# Patient Record
Sex: MALE | Race: WHITE | NOT HISPANIC OR LATINO | Employment: OTHER | URBAN - METROPOLITAN AREA
[De-identification: names, ages, dates, MRNs, and addresses within clinical notes are randomized per-mention and may not be internally consistent; named-entity substitution may affect disease eponyms.]

---

## 2017-08-10 ENCOUNTER — GENERIC CONVERSION - ENCOUNTER (OUTPATIENT)
Dept: OTHER | Facility: OTHER | Age: 52
End: 2017-08-10

## 2017-09-11 ENCOUNTER — ALLSCRIPTS OFFICE VISIT (OUTPATIENT)
Dept: OTHER | Facility: OTHER | Age: 52
End: 2017-09-11

## 2017-10-03 ENCOUNTER — ANESTHESIA EVENT (OUTPATIENT)
Dept: PERIOP | Facility: AMBULARY SURGERY CENTER | Age: 52
End: 2017-10-03

## 2017-10-12 ENCOUNTER — ANESTHESIA (OUTPATIENT)
Dept: PERIOP | Facility: AMBULARY SURGERY CENTER | Age: 52
End: 2017-10-12

## 2017-10-25 ENCOUNTER — ANESTHESIA EVENT (OUTPATIENT)
Dept: GASTROENTEROLOGY | Facility: HOSPITAL | Age: 52
End: 2017-10-25
Payer: COMMERCIAL

## 2017-10-26 NOTE — CONSULTS
Assessment  1  Colon cancer screening (V76 51) (Z12 11)    Plan  Colon cancer screening    · Suprep Bowel Prep Kit 17 5-3 13-1 6 GM/180ML Oral Solution; DILUTE CONTENTS  AND USE AS DIRECTED FOR BOWEL PREP   Rx By: Connie Shields; Dispense: 0 Days ; #:1 ML; Refill: 0;For: Colon cancer screening; INGE = N; Verified Transmission to [id number] Last Updated By: System, Provigent; 9/11/2017 2:16:02 PM   · COLONOSCOPY (GI, SURG); Status:Hold For - Scheduling; Requested for:[id number];    [facility]; Due:19Nfc1013; Ordered; For:Colon cancer screening; Ordered By:Clementina Putnam;    Discussion/Summary  Discussion Summary: 1  Colon cancer screening: average risk, no prior colonoscopy, not on any antiplatelet agents  We'll schedule colonoscopy today  Patient was explained about the risks and benefits of the procedure  Risks including but not limited to bleeding, infection, perforation were explained in detail  Also explained about less than 100% sensitivity with the exam and other alternatives  Chief Complaint  Chief Complaint Free Text Note Form: Colon cancer screening      History of Present Illness  HPI: This is a 57-year-old Morbidly obese male, who presents for evaluation of colorectal cancer screening  Patient denies any change in bowel habits, denies constipation, hematochezia or unintentional weight loss  He denies any family history of colon cancer but does have family history of colon polyps in his father  He has never had a colonoscopy himself before  He denies any symptoms of heartburn, dysphagia, hematemesis, melena or nausea and vomiting  Review of Systems  Complete-Male GI Adult:   Constitutional: No fever or chills, feels well, no tiredness, no recent weight gain or weight loss  Eyes: No complaints of eye pain, no red eyes, no discharge from eyes, no itchy eyes     ENT: no complaints of earache, no hearing loss, no nosebleeds, no nasal discharge, no sore throat, no hoarseness  Cardiovascular: No complaints of slow heart rate, no fast heart rate, no chest pain, no palpitations, no leg claudication, no lower extremity  Respiratory: No complaints of shortness of breath, no wheezing, no cough, no SOB on exertion, no orthopnea or PND  Gastrointestinal: as noted in HPI  Genitourinary: No complaints of dysuria, no incontinence, no hesitancy, no nocturia, no genital lesion, no testicular pain  Musculoskeletal: No complaints of arthralgia, no myalgias, no joint swelling or stiffness, no limb pain or swelling  Integumentary: No complaints of skin rash or skin lesions, no itching, no skin wound, no dry skin  Neurological: No compliants of headache, no confusion, no convulsions, no numbness or tingling, no dizziness or fainting, no limb weakness, no difficulty walking  Psychiatric: Is not suicidal, no sleep disturbances, no anxiety or depression, no change in personality, no emotional problems  Endocrine: No complaints of proptosis, no hot flashes, no muscle weakness, no erectile dysfunction, no deepening of the voice, no feelings of weakness  Hematologic/Lymphatic: No complaints of swollen glands, no swollen glands in the neck, does not bleed easily, no easy bruising  ROS Reviewed:   ROS reviewed  Active Problems  1  Allergic rhinitis (477 9) (J30 9)   2  Anxiety disorder (300 00) (F41 9)   3  Arthritis of knee (716 96) (M17 10)   4  Asthma (493 90) (J45 909)   5  Constipation (564 00) (K59 00)   6  Depression (311) (F32 9)   7  Eczema (692 9) (L30 9)   8  Erectile dysfunction of non-organic origin (302 72) (F52 21)   9  Fatigue (780 79) (R53 83)   10  Hyperlipidemia (272 4) (E78 5)   11  Insomnia (780 52) (G47 00)   12  Laryngitis (464 00) (J04 0)   13  Low back pain (724 2) (M54 5)   14  Morbid obesity (278 01) (E66 01)   15  Morbid or severe obesity due to excess calories (278 01) (E66 01)   16   Need for prophylactic vaccination and inoculation against influenza (V04 81) (Z23)   17  Other muscle spasm (728 85) (M62 838)   18  Pharyngitis (462) (J02 9)   19  Sleep apnea (780 57) (G47 30)   20  Urge incontinence of urine (788 31) (N39 41)    Past Medical History  1  Need for prophylactic vaccination and inoculation against influenza (V04 81) (Z23)  Active Problems And Past Medical History Reviewed: The active problems and past medical history were reviewed and updated today  Surgical History  1  History of Gallbladder Surgery   2  History of Hernia Repair  Surgical History Reviewed: The surgical history was reviewed and updated today  Family History  Mother    1  Family history of Breast Cancer (V16 3)  Father    2  Family history of Heart Disease (V17 49)  Family History Reviewed: The family history was reviewed and updated today  Social History   · Alcohol Use (History)   · Daily Coffee Consumption (___ Cups/Day)   · Denied: History of Daily Cola Consumption (___ Cans/Day)   · Daily Tea Consumption (___ Cups/Day)   · Never A Smoker  Social History Reviewed: The social history was reviewed and updated today  Current Meds   1  Advair Diskus 250-50 MCG/DOSE Inhalation Aerosol Powder Breath Activated; INHALE 1   PUFF TWICE DAILY  RINSE MOUTH AFTER USE; Therapy: 66JDM3140 to (Aleisha Meek)  Requested for: 94CZX1189; Last   GI:25INB9455 Ordered   2  BuPROPion HCl ER (SR) 150 MG Oral Tablet Extended Release 12 Hour; TAKE 1   TABLET TWICE DAILY; Therapy: 32ICE0298 to (Evaluate:42Qoh5615)  Requested for: 20Jan2017; Last   Rx:20Jan2017 Ordered   3  Cialis 10 MG Oral Tablet; TAKE 1 TABLET 1 HOUR BEFORE ACTIVITY AS NEEDED; Therapy: 38MDR4049 to (Evaluate:30Jun2016); Last Rx:02Mar2016 Ordered   4  Fluticasone Propionate 50 MCG/ACT Nasal Suspension; USE 2 SPRAYS IN EACH   NOSTRIL DAILY; Therapy: 71FBF4376 to ([phone])  Requested for: 58Doo9671; Last   Rx:45Crc5725 Ordered   5   Meloxicam 15 MG Oral Tablet; TAKE 1 TABLET BY MOUTH EVERY DAY WITH FOOD; Therapy: 11Aug2015 to (Evaluate:13Kuh7299)  Requested for: 59JAJ8002; Last   Rx:93Ogj0373 Ordered   6  Montelukast Sodium 10 MG Oral Tablet; TAKE ONE TABLET BY MOUTH ONCE DAILY; Therapy: 47DIL0998 to (Evaluate:15Jan2018)  Requested for: 20Jan2017; Last   Rx:20Jan2017 Ordered   7  Proventil  (90 Base) MCG/ACT Inhalation Aerosol Solution; INHALE ONE TO   TWO PUFFS BY MOUTH EVERY 4 TO 6 HOURS AS NEEDED AND AS DIRECTED; Therapy: 43WRT9429 to (Heladio Qiu)  Requested for: 62DOT3162; Last   Rx:11Lav5710 Ordered   8  TraMADol HCl  MG Oral Tablet Extended Release 24 Hour; TAKE 1 TABLET DAILY   AS NEEDED; Therapy: 83GLO3244 to (Evaluate:29Bvx7810); Last Rx:01Xnp3773 Ordered   9  Zolpidem Tartrate 10 MG Oral Tablet; Take 1 tablet daily; Therapy: 72XWP1679 to (Evaluate:08Qtd9155)  Requested for: 25VQS9158; Last   Rx:77Mgb1121 Ordered  Medication List Reviewed: The medication list was reviewed and updated today  Allergies  1  Opioid Analgesics    Vitals  Vital Signs    Recorded: 11Sep2017 02:08PM   Temperature 97 5 F   Heart Rate 88   Systolic 991   Diastolic 62   Weight 345 lb    BMI Calculated 46 96   BSA Calculated 2 6   O2 Saturation 95     Physical Exam    Constitutional   General appearance: No acute distress, well appearing and well nourished  Eyes   Conjunctiva and lids: No swelling, erythema, or discharge  Pulmonary   Respiratory effort: No increased work of breathing or signs of respiratory distress  Auscultation of lungs: Clear to auscultation, equal breath sounds bilaterally, no wheezes, no rales, no rhonci  Cardiovascular   Palpation of heart: Normal PMI, no thrills  Auscultation of heart: Normal rate and rhythm, normal S1 and S2, without murmurs  Examination of extremities for edema and/or varicosities: Normal     Abdomen   Abdomen: Non-tender, no masses  Liver and spleen: No hepatomegaly or splenomegaly      Lymphatic   Palpation of lymph nodes in neck: No lymphadenopathy  Skin   Skin and subcutaneous tissue: Normal without rashes or lesions      Psychiatric   Orientation to person, place and time: Normal     Mood and affect: Normal          Signatures   Electronically signed by : MARITZA Tijerina ; Sep 11 2017  2:20PM EST                       (Author)

## 2017-10-27 RX ORDER — ALBUTEROL SULFATE 90 UG/1
2 AEROSOL, METERED RESPIRATORY (INHALATION) EVERY 6 HOURS PRN
COMMUNITY

## 2017-10-27 RX ORDER — FUROSEMIDE 20 MG/1
20 TABLET ORAL
COMMUNITY

## 2017-10-27 RX ORDER — MONTELUKAST SODIUM 10 MG/1
10 TABLET ORAL
COMMUNITY
End: 2018-03-03 | Stop reason: SDUPTHER

## 2017-10-27 RX ORDER — FLUTICASONE PROPIONATE 50 MCG
1 SPRAY, SUSPENSION (ML) NASAL AS NEEDED
COMMUNITY

## 2017-10-27 RX ORDER — ARMODAFINIL 150 MG/1
150 TABLET ORAL
COMMUNITY
End: 2021-01-17 | Stop reason: SDUPTHER

## 2017-10-27 RX ORDER — ZOLPIDEM TARTRATE 10 MG/1
10 TABLET ORAL
COMMUNITY

## 2017-10-27 NOTE — PRE-PROCEDURE INSTRUCTIONS
Pre-Surgery Instructions:   Medication Instructions    albuterol (PROVENTIL HFA,VENTOLIN HFA) 90 mcg/act inhaler Instructed patient per Anesthesia Guidelines   Armodafinil 150 MG tablet Instructed patient per Anesthesia Guidelines   fluticasone (FLONASE) 50 mcg/act nasal spray Instructed patient per Anesthesia Guidelines   fluticasone-salmeterol (ADVAIR) 250-50 mcg/dose inhaler Instructed patient per Anesthesia Guidelines   furosemide (LASIX) 20 mg tablet Instructed patient per Anesthesia Guidelines   montelukast (SINGULAIR) 10 mg tablet Instructed patient per Anesthesia Guidelines   Testosterone Cypionate & Prop (TESTOSTERONE EO-PRO- IM) Instructed patient per Anesthesia Guidelines   zolpidem (AMBIEN) 10 mg tablet Instructed patient per Anesthesia Guidelines      Pre op instructions given-instructed to follow Dr Silver Lockwood instructions including bowel prep

## 2017-11-13 ENCOUNTER — ANESTHESIA (OUTPATIENT)
Dept: GASTROENTEROLOGY | Facility: HOSPITAL | Age: 52
End: 2017-11-13
Payer: COMMERCIAL

## 2018-01-04 ENCOUNTER — GENERIC CONVERSION - ENCOUNTER (OUTPATIENT)
Dept: GASTROENTEROLOGY | Facility: CLINIC | Age: 53
End: 2018-01-04

## 2018-01-04 ENCOUNTER — HOSPITAL ENCOUNTER (OUTPATIENT)
Facility: HOSPITAL | Age: 53
Setting detail: OUTPATIENT SURGERY
Discharge: HOME | End: 2018-01-04
Attending: INTERNAL MEDICINE | Admitting: INTERNAL MEDICINE
Payer: COMMERCIAL

## 2018-01-04 VITALS
HEART RATE: 67 BPM | RESPIRATION RATE: 18 BRPM | OXYGEN SATURATION: 96 % | HEIGHT: 71 IN | WEIGHT: 304.24 LBS | SYSTOLIC BLOOD PRESSURE: 113 MMHG | TEMPERATURE: 97.6 F | DIASTOLIC BLOOD PRESSURE: 70 MMHG | BODY MASS INDEX: 42.59 KG/M2

## 2018-01-04 DIAGNOSIS — Z12.11 ENCOUNTER FOR SCREENING FOR MALIGNANT NEOPLASM OF COLON: ICD-10-CM

## 2018-01-04 PROCEDURE — 88305 TISSUE EXAM BY PATHOLOGIST: CPT | Performed by: INTERNAL MEDICINE

## 2018-01-04 RX ORDER — SODIUM CHLORIDE 9 MG/ML
125 INJECTION, SOLUTION INTRAVENOUS CONTINUOUS
Status: DISCONTINUED | OUTPATIENT
Start: 2018-01-04 | End: 2018-01-04 | Stop reason: HOSPADM

## 2018-01-04 RX ORDER — LIDOCAINE HYDROCHLORIDE 10 MG/ML
INJECTION, SOLUTION INFILTRATION; PERINEURAL AS NEEDED
Status: DISCONTINUED | OUTPATIENT
Start: 2018-01-04 | End: 2018-01-04 | Stop reason: SURG

## 2018-01-04 RX ORDER — DIPHENOXYLATE HYDROCHLORIDE AND ATROPINE SULFATE 2.5; .025 MG/1; MG/1
1 TABLET ORAL DAILY
COMMUNITY

## 2018-01-04 RX ORDER — PROPOFOL 10 MG/ML
INJECTION, EMULSION INTRAVENOUS AS NEEDED
Status: DISCONTINUED | OUTPATIENT
Start: 2018-01-04 | End: 2018-01-04 | Stop reason: SURG

## 2018-01-04 RX ADMIN — PROPOFOL 50 MG: 10 INJECTION, EMULSION INTRAVENOUS at 09:18

## 2018-01-04 RX ADMIN — SODIUM CHLORIDE: 0.9 INJECTION, SOLUTION INTRAVENOUS at 08:49

## 2018-01-04 RX ADMIN — PROPOFOL 50 MG: 10 INJECTION, EMULSION INTRAVENOUS at 09:12

## 2018-01-04 RX ADMIN — PROPOFOL 100 MG: 10 INJECTION, EMULSION INTRAVENOUS at 09:03

## 2018-01-04 RX ADMIN — PROPOFOL 50 MG: 10 INJECTION, EMULSION INTRAVENOUS at 09:08

## 2018-01-04 RX ADMIN — PROPOFOL 50 MG: 10 INJECTION, EMULSION INTRAVENOUS at 09:15

## 2018-01-04 RX ADMIN — LIDOCAINE HYDROCHLORIDE 20 MG: 10 INJECTION, SOLUTION INFILTRATION; PERINEURAL at 09:03

## 2018-01-04 RX ADMIN — PROPOFOL 25 MG: 10 INJECTION, EMULSION INTRAVENOUS at 09:04

## 2018-01-04 RX ADMIN — PROPOFOL 25 MG: 10 INJECTION, EMULSION INTRAVENOUS at 09:06

## 2018-01-04 RX ADMIN — PROPOFOL 50 MG: 10 INJECTION, EMULSION INTRAVENOUS at 09:10

## 2018-01-04 NOTE — DISCHARGE INSTRUCTIONS
Discharge home  Resume regular diet  Resume home meds  Call the office in 3 weeks for biopsy results  Repeat colonoscopy 5-10 years based on path  Call the office if you have severe abdominal pain, bleeding, or fevers  Colonoscopy   AMBULATORY CARE:   What you need to know about a colonoscopy:  A colonoscopy is a procedure to examine the inside of your colon (intestine) with a scope  A scope is a flexible tube with a small light and camera on the end  Polyps or tissue growths may be removed during your colonoscopy  What you need to do the week before your colonoscopy: You will need to stop taking medicines that contain aspirin or iron for 7 days before your colonoscopy  If you take anticoagulants, such as warfarin, ask when you should stop taking it  Make plans for someone to drive you home after your procedure  How to prepare for your colonoscopy: Your healthcare provider will have you prepare your bowels before your procedure  Your bowels will need to be empty before your procedure to allow him to clearly see your colon  You will need to do the following the day before your procedure:  · Have only clear liquids  for the entire day before your colonoscopy  Clear liquid diet includes clear fruit juices and broths, clear flavored gelatin, and hard candy  It also includes coffee, tea, carbonated beverages, and clear sports drinks  · Follow your bowel prep as directed  There are many different preparations that can be given before a colonoscopy  Some are given over 2 hours and others over 6 hours  Some are given earlier in the afternoon the day before the colonoscopy  Others are given the day before and then the morning of the colonoscopy  With any bowel prep, stay close to the bathroom  This liquid will cause your bowels to move frequently  · An enema  may be needed  Your healthcare provider may tell you to use an enema to help clean out your bowels      · Do not eat or drink anything after midnight  This will help prevent problems that can happen if you vomit while under anesthesia  What will happen during your colonoscopy:   · You will be given medicine to help you relax  You will lie on your left side and raise one or both knees toward your chest  Your healthcare provider will examine your anus and use a finger to check your rectum  You may need another enema if your bowel is not empty  The scope will be lubricated and gently placed into your anus  It will then be passed through your rectum and into your colon  Water or air will be put into your colon to help clean or expand it  This is done so your healthcare provider can see your colon clearly  · Tissue samples may be taken from the walls of your bowel and sent to a lab for tests  If you have a polyp, your healthcare provider will pass a wire loop through the scope and use it to hold the polyp  The polyp is then burned or cut off the wall of your colon  Removed polyps are sent to a lab for tests  Pictures of your colon may be taken during the procedure  The scope will be removed when the procedure is done  What will happen after your colonoscopy:   · Rest after your procedure  You may feel bloated, have some gas and abdominal discomfort  You may need to lie on your right side with a heating pad on your abdomen  You may need to take short walks to help move the gas out  Eat small meals, if you feel bloated  Do not drive or make important decisions until the day after your procedure  · You may have polyps removed  Do not take aspirin or go on long car trips for 7 days after your procedure  Ask your healthcare provider about any other limits after your procedure  Risks of a colonoscopy: You may have pain or bleeding after the scope or polyps are removed  You may also have a slow heartbeat, decreased blood pressure, or increased sweating  Your colon may tear due to the increased pressure from the scope and other instruments   This may cause bowel contents to leak out of your colon and into your abdomen  If this happens, you will need to stay in the hospital and have surgery on your colon  Seek care immediately if:   · You have a large amount of bright red blood in your bowel movements  · Your abdomen is hard and firm and you have severe pain  · You have sudden trouble breathing  Contact your healthcare provider if:   · You develop a rash or hives  · You have a fever within 24 hours of your procedure  · You have not had a bowel movement for 3 days after your procedure  · You have questions or concerns about your condition or care  Activity:   · Do not lift, strain, or run  for 3 days after your procedure  · Rest after your procedure  You have been given medicine to relax you  Do not  drive or make important decisions until the day after your procedure  Return to your normal activity as directed  · Relieve gas and discomfort from bloating  by lying on your right side with a heating pad on your abdomen  You may need to take short walks to help the gas move out  Eat small meals until bloating is relieved  If you had polyps removed: For 7 days after your procedure:  · Do not  take aspirin  · Do not  go on long car rides  Help prevent constipation:   · Eat a variety of healthy foods  Healthy foods include fruit, vegetables, whole-grain breads, low-fat dairy products, beans, lean meat, and fish  Ask if you need to be on a special diet  Your healthcare provider may recommend that you eat high-fiber foods such as cooked beans  Fiber helps you have regular bowel movements  · Drink liquids as directed  Adults should drink between 9 and 13 eight-ounce cups of liquid every day  Ask what amount is best for you  For most people, good liquids to drink are water, juice, and milk  · Exercise as directed  Talk to your healthcare provider about the best exercise plan for you   Exercise can help prevent constipation, decrease your blood pressure and improve your health  Follow up with your healthcare provider as directed:  Write down your questions so you remember to ask them during your visits  © 2017 [address] [employer]  Information is for End User's use only and may not be sold, redistributed or otherwise used for commercial purposes  All illustrations and images included in CareNotes® are the copyrighted property of [employer] M [employer]  or [address]  The above information is an  only  It is not intended as medical advice for individual conditions or treatments  Talk to your doctor, nurse or pharmacist before following any medical regimen to see if it is safe and effective for you  Diverticulosis   WHAT YOU NEED TO KNOW:   Diverticulosis is a condition that causes small pockets called diverticula to form in your intestine  These pockets make it difficult for bowel movements to pass through your digestive system  DISCHARGE INSTRUCTIONS:   Seek care immediately if:   · You have severe pain on the left side of your lower abdomen  · Your bowel movements are bright or dark red  Contact your healthcare provider if:   · You have a fever and chills  · You feel dizzy or lightheaded  · You have nausea, or you are vomiting  · You have a change in your bowel movements  · You have questions or concerns about your condition or care  Medicines:   · Medicines  to soften your bowel movements may be given  You may also need medicines to treat symptoms such as bloating and pain  · Take your medicine as directed  Contact your healthcare provider if you think your medicine is not helping or if you have side effects  Tell him or her if you are allergic to any medicine  Keep a list of the medicines, vitamins, and herbs you take  Include the amounts, and when and why you take them  Bring the list or the pill bottles to follow-up visits   Carry your medicine list with you in case of an emergency  Self-care: The goal of treatment is to manage any symptoms you have and prevent other problems such as diverticulitis  Diverticulitis is swelling or infection of the diverticula  Your healthcare provider may recommend any of the following:  · Eat a variety of high-fiber foods  High-fiber foods help you have regular bowel movements  High-fiber foods include cooked beans, fruits, vegetables, and some cereals  Most adults need 25 to 35 grams of fiber each day  Your healthcare provider may recommend that you have more  Ask your healthcare provider how much fiber you need  Increase fiber slowly  You may have abdominal discomfort, bloating, and gas if you add fiber to your diet too quickly  You may need to take a fiber supplement if you are not getting enough fiber from food  · Drink liquids as directed  You may need to drink 2 to 3 liters (8 to 12 cups) of liquids every day  Ask your healthcare provider how much liquid to drink each day and which liquids are best for you  · Apply heat  on your abdomen for 20 to 30 minutes every 2 hours for as many days as directed  Heat helps decrease pain and muscle spasms  Help prevent diverticulitis or other symptoms: The following may help decrease your risk for diverticulitis or symptoms, such as bleeding  Talk to your provider about these or other things you can do to prevent problems that may occur with diverticulosis  · Exercise regularly  Ask your healthcare provider about the best exercise plan for you  Exercise can help you have regular bowel movements  Get 30 minutes of exercise on most days of the week  · Maintain a healthy weight  Ask your healthcare provider how much you should weigh  Ask him or her to help you create a weight loss plan if you are overweight  · Do not smoke  Nicotine and other chemicals in cigarettes increase your risk for diverticulitis   Ask your healthcare provider for information if you currently smoke and need help to quit  E-cigarettes or smokeless tobacco still contain nicotine  Talk to your healthcare provider before you use these products  · Ask your healthcare provider if it is safe to take NSAIDs  NSAIDs may increase your risk of diverticulitis  Follow up with your healthcare provider as directed:  Write down your questions so you remember to ask them during your visits  © 2017 [address] [employer] Information is for End User's use only and may not be sold, redistributed or otherwise used for commercial purposes  All illustrations and images included in CareNotes® are the copyrighted property of [employer]  or [address]  The above information is an  only  It is not intended as medical advice for individual conditions or treatments  Talk to your doctor, nurse or pharmacist before following any medical regimen to see if it is safe and effective for you  Diverticulosis   WHAT YOU NEED TO KNOW:   Diverticulosis is a condition that causes small pockets called diverticula to form in your intestine  These pockets make it difficult for bowel movements to pass through your digestive system  DISCHARGE INSTRUCTIONS:   Seek care immediately if:   · You have severe pain on the left side of your lower abdomen  · Your bowel movements are bright or dark red  Contact your healthcare provider if:   · You have a fever and chills  · You feel dizzy or lightheaded  · You have nausea, or you are vomiting  · You have a change in your bowel movements  · You have questions or concerns about your condition or care  Medicines:   · Medicines  to soften your bowel movements may be given  You may also need medicines to treat symptoms such as bloating and pain  · Take your medicine as directed  Contact your healthcare provider if you think your medicine is not helping or if you have side effects  Tell him or her if you are allergic to any medicine   Keep a list of the medicines, vitamins, and herbs you take  Include the amounts, and when and why you take them  Bring the list or the pill bottles to follow-up visits  Carry your medicine list with you in case of an emergency  Self-care: The goal of treatment is to manage any symptoms you have and prevent other problems such as diverticulitis  Diverticulitis is swelling or infection of the diverticula  Your healthcare provider may recommend any of the following:  · Eat a variety of high-fiber foods  High-fiber foods help you have regular bowel movements  High-fiber foods include cooked beans, fruits, vegetables, and some cereals  Most adults need 25 to 35 grams of fiber each day  Your healthcare provider may recommend that you have more  Ask your healthcare provider how much fiber you need  Increase fiber slowly  You may have abdominal discomfort, bloating, and gas if you add fiber to your diet too quickly  You may need to take a fiber supplement if you are not getting enough fiber from food  · Drink liquids as directed  You may need to drink 2 to 3 liters (8 to 12 cups) of liquids every day  Ask your healthcare provider how much liquid to drink each day and which liquids are best for you  · Apply heat  on your abdomen for 20 to 30 minutes every 2 hours for as many days as directed  Heat helps decrease pain and muscle spasms  Help prevent diverticulitis or other symptoms: The following may help decrease your risk for diverticulitis or symptoms, such as bleeding  Talk to your provider about these or other things you can do to prevent problems that may occur with diverticulosis  · Exercise regularly  Ask your healthcare provider about the best exercise plan for you  Exercise can help you have regular bowel movements  Get 30 minutes of exercise on most days of the week  · Maintain a healthy weight  Ask your healthcare provider how much you should weigh   Ask him or her to help you create a weight loss plan if you are overweight  · Do not smoke  Nicotine and other chemicals in cigarettes increase your risk for diverticulitis  Ask your healthcare provider for information if you currently smoke and need help to quit  E-cigarettes or smokeless tobacco still contain nicotine  Talk to your healthcare provider before you use these products  · Ask your healthcare provider if it is safe to take NSAIDs  NSAIDs may increase your risk of diverticulitis  Follow up with your healthcare provider as directed:  Write down your questions so you remember to ask them during your visits  © 2017 [address]  Information is for End User's use only and may not be sold, redistributed or otherwise used for commercial purposes  All illustrations and images included in CareNotes® are the copyrighted property of [employer]  or [employer]  The above information is an  only  It is not intended as medical advice for individual conditions or treatments  Talk to your doctor, nurse or pharmacist before following any medical regimen to see if it is safe and effective for you  Colorectal Polyps   WHAT YOU NEED TO KNOW:   Colorectal polyps are small growths of tissue in the lining of the colon and rectum  Most polyps are hyperplastic polyps and are usually benign (noncancerous)  Certain types of polyps, called adenomatous polyps, may turn into cancer  DISCHARGE INSTRUCTIONS:   Follow up with your healthcare provider or gastroenterologist as directed: You may need to return for more tests, such as another colonoscopy  Write down your questions so you remember to ask them during your visits  Reduce your risk for colorectal polyps:   · Eat a variety of healthy foods:  Healthy foods include fruit, vegetables, whole-grain breads, low-fat dairy products, beans, lean meat, and fish  Ask if you need to be on a special diet      · Maintain a healthy weight:  Ask your healthcare provider if you need to lose weight and how much you need to lose  Ask for help with a weight loss program     · Exercise:  Begin to exercise slowly and do more as you get stronger  Talk with your healthcare provider before you start an exercise program      · Limit alcohol:  Your risk for polyps increases the more you drink  · Do not smoke: If you smoke, it is never too late to quit  Ask for information about how to stop  For support and more information:   · Lindseybiancasammy Jurado ([facility])  [address] [place], [place] [address]  Phone: 7- [phone]  Web Address: www digestive  niddk nih gov  Contact your healthcare provider or gastroenterologist if:   · You have a fever  · You have chills, a cough, or feel weak and achy  · You have abdominal pain that does not go away or gets worse after you take medicine  · Your abdomen is swollen  · You are losing weight without trying  · You have questions or concerns about your condition or care  Seek care immediately or call 911 if:   · You have sudden shortness of breath  · You have a fast heart rate, fast breathing, or are too dizzy to stand up  · You have severe abdominal pain  · You see blood in your bowel movement  © 2017 [address] Information is for End User's use only and may not be sold, redistributed or otherwise used for commercial purposes  All illustrations and images included in CareNotes® are the copyrighted property of [employer]  or [employer]  The above information is an  only  It is not intended as medical advice for individual conditions or treatments  Talk to your doctor, nurse or pharmacist before following any medical regimen to see if it is safe and effective for you

## 2018-01-04 NOTE — ANESTHESIA PREPROCEDURE EVALUATION
Review of Systems/Medical History  Patient summary reviewed  Chart reviewed  No history of anesthetic complications     Cardiovascular  Negative cardio ROS    Pulmonary  Asthma: , Sleep apnea CPAP, ,        GI/Hepatic            Endo/Other     GYN       Hematology   Musculoskeletal  Obesity  morbid obesity,        Neurology   Psychology           Physical Exam    Airway    Mallampati score: II  TM Distance: >3 FB  Neck ROM: full     Dental   upper dentures,     Cardiovascular  Comment: Negative ROS,     Pulmonary      Other Findings        Anesthesia Plan  ASA Score- 2     Anesthesia Type- IV sedation with anesthesia with ASA Monitors  Additional Monitors:   Airway Plan:         Plan Factors-    Induction- intravenous  Postoperative Plan-     Informed Consent- Anesthetic plan and risks discussed with patient  I personally reviewed this patient with the CRNA  Discussed and agreed on the Anesthesia Plan with the CRNA  Arabella Liao

## 2018-01-04 NOTE — OP NOTE
**** GI/ENDOSCOPY REPORT ****     PATIENT NAME: Kevan MAHMOOD ------ VISIT ID:  Patient ID: [id number]   YOB: 1965     INTRODUCTION: Colonoscopy - A 45 male patient presents for an outpatient   Colonoscopy at [place] [address]  PREVIOUS COLONOSCOPY:     INDICATIONS: Average-risk screening  CONSENT:  The benefits, risks, and alternatives to the procedure were   discussed and informed consent was obtained from the patient  PREPARATION: EKG, pulse, pulse oximetry and blood pressure were monitored   throughout the procedure  The patient was identified by myself both   verbally and by visual inspection of ID band  ASA Classification: Class 2   - Patient has mild to moderate systemic disturbance that may or may not be   related to the disorder requiring surgery  Airway Assessment   Classification: Airway class 2 - Visualization of the soft palate, fauces   and uvula  MEDICATIONS: Anesthesia-check records     PROCEDURE:  The endoscope was passed without difficulty through the anus   under direct visualization and advanced to the cecum, confirmed by   appendiceal orifice and ileocecal valve  The scope was withdrawn and the   mucosa was carefully examined  The quality of the preparation was good  Cecal Intubation Time: 2 minutes(s) Scope Withdrawal Time: 13 minutes(s)     RECTAL EXAM: Normal rectal exam      FINDINGS:  A single polyp, measuring less than 5 mm in size, was found in   the rectum  The polyp was removed by cold biopsy polypectomy  There was   evidence of diverticulosis in the sigmoid colon  On retroflexed view,   internal hemorrhoids were found  Otherwise, the colon appeared to be   normal      COMPLICATIONS: There were no complications  IMPRESSIONS: A single polyp found in the rectum; removed by cold biopsy   polypectomy  Diverticulosis found in the sigmoid colon  Internal   hemorrhoids  Otherwise normal colonoscopy with a good prep and good   visualization  RECOMMENDATIONS: Colonoscopy recommended in a 5-10 year  Discharge home   when standard parameters are met  Resume regular diet as tolerated  Continue current medications  Follow-up on the results of the biopsy   specimens  PATHOLOGY SPECIMENS: Removed by cold biopsy polypectomy  Yes     PROCEDURE CODES:     ICD-9 Codes: 211 4 Benign neoplasm of rectum and anal canal 562 10   Diverticulosis of colon (without mention of hemorrhage)     ICD-10 Codes: Z12 11 Encounter for screening for malignant neoplasm of   colon K63 5 Polyp of colon K57 Diverticular disease of intestine K64 9   Unspecified hemorrhoids     PERFORMED BY: MARITZA Joy  on 01/04/2018  Version 1, electronically signed by MARITZA Vaca  on 01/04/2018 at   09:24

## 2018-01-04 NOTE — H&P
History and Physical - [facility]  Amanda Nelson Stem 45 y o  male MRN: [id number]    HPI: Wilton Haider is a 45y o  year old male who presents for average risk screening colonoscopy  Review of Systems    Historical Information   Past Medical History:   Diagnosis Date    Asthma     CPAP (continuous positive airway pressure) dependence     History of transfusion     age 3    Obese     Sleep apnea      Past Surgical History:   Procedure Laterality Date    CHOLECYSTECTOMY      HERNIA REPAIR Bilateral      Social History   History   Alcohol Use    Yes     Comment: socially     History   Drug Use No     History   Smoking Status    Never Smoker   Smokeless Tobacco    Never Used     History reviewed  No pertinent family history  Meds/Allergies     Prescriptions Prior to Admission   Medication    albuterol (PROVENTIL HFA,VENTOLIN HFA) 90 mcg/act inhaler    Armodafinil 150 MG tablet    Cholecalciferol (VITAMIN D PO)    fluticasone (FLONASE) 50 mcg/act nasal spray    fluticasone-salmeterol (ADVAIR) 250-50 mcg/dose inhaler    furosemide (LASIX) 20 mg tablet    montelukast (SINGULAIR) 10 mg tablet    multivitamin (THERAGRAN) TABS    Testosterone Cypionate & Prop (TESTOSTERONE EO-PRO- IM)    zolpidem (AMBIEN) 10 mg tablet       Allergies   Allergen Reactions    Other      Opioid analgesics       Objective     Height 5' 10" (1 778 m), weight (!) 143 kg (315 lb)  PHYSICAL EXAM    Gen: NAD  CV: RRR  CHEST: Clear  ABD: soft, NT/ND  EXT: no edema  Neuro: AAO      ASSESSMENT/PLAN:  This is a 45y o  year old male here for  average risk screening colonoscopy         PLAN:   Procedure: colonoscopy

## 2018-01-07 ENCOUNTER — GENERIC CONVERSION - ENCOUNTER (OUTPATIENT)
Dept: OTHER | Facility: OTHER | Age: 53
End: 2018-01-07

## 2018-01-12 VITALS
SYSTOLIC BLOOD PRESSURE: 140 MMHG | WEIGHT: 315 LBS | TEMPERATURE: 97.5 F | HEART RATE: 88 BPM | DIASTOLIC BLOOD PRESSURE: 62 MMHG | OXYGEN SATURATION: 95 %

## 2018-01-23 NOTE — RESULT NOTES
Discussion/Summary   Please inform the patient that 1 polyp removed was a tubular adenoma  There was no high-grade dysplasia and no cancer  I recommend repeat colonoscopy in 10 years, please put in for recall  Please have the patient call with any questions or concerns  Verified Results  (1) TISSUE EXAM [id number] 09:21AM Placido [place]     Test Name Result Flag Reference   LAB AP CASE REPORT (Report)     Surgical Pathology Report             Case: [id number]                   Authorizing Provider: Simi Tinoco MD      Collected:      01/04/2018 0677        Ordering Location:   [facility]    Received:      01/04/2018 [address] [place] [address] Endoscopy                               Pathologist:      Renetta Ashton MD                             Specimen:  Polyp, Colorectal, rectum   LAB AP FINAL DIAGNOSIS      A  Rectal polyp:    - Hyperplastic polyp     - Negative for dysplasia and malignancy  Electronically signed by Renetta Ashton MD on 1/5/2018 at 12:14 PM   LAB AP NOTE      Interpretation performed at [facility], [place]   LAB AP SURGICAL ADDITIONAL INFORMATION (Report)     All controls performed with the immunohistochemical stains reported above   reacted appropriately  These tests were developed and their performance   characteristics determined by [facility] or   [place]  They may not be cleared or approved by the [place]  Food and Drug Administration  The FDA has determined that such clearance   or approval is not necessary  These tests are used for clinical purposes  They should not be regarded as investigational or for research  This   laboratory has been approved by CLIA 88, designated as a high-complexity   laboratory and is qualified to perform these tests  LAB AP GROSS DESCRIPTION (Report)     A   The specimen is received in formalin, labeled with the patient's name   and hospital number, and is designated rectal polyp  The specimen   consists of 2 tan-pink soft tissue fragments each measuring 0 2 cm in   greatest dimension  The specimen is entirely submitted in 1 cassette  Note: The estimated total formalin fixation time based upon information   provided by the submitting clinician and the standard processing schedule   is under 72 hours       AG[facility]zyk   LAB AP CLINICAL INFORMATION Cold forceps     Cold forceps

## 2018-03-03 DIAGNOSIS — J45.909 UNCOMPLICATED ASTHMA, UNSPECIFIED ASTHMA SEVERITY, UNSPECIFIED WHETHER PERSISTENT: Primary | ICD-10-CM

## 2018-03-04 RX ORDER — MONTELUKAST SODIUM 10 MG/1
TABLET ORAL
Qty: 90 TABLET | Refills: 3 | Status: SHIPPED | OUTPATIENT
Start: 2018-03-04

## 2018-10-02 ENCOUNTER — APPOINTMENT (OUTPATIENT)
Dept: RADIOLOGY | Facility: MEDICAL CENTER | Age: 53
End: 2018-10-02
Payer: COMMERCIAL

## 2018-10-02 ENCOUNTER — TRANSCRIBE ORDERS (OUTPATIENT)
Dept: ADMINISTRATIVE | Facility: HOSPITAL | Age: 53
End: 2018-10-02

## 2018-10-02 DIAGNOSIS — S23.3XXD SPRAIN OF LIGAMENTS OF THORACIC SPINE, SUBSEQUENT ENCOUNTER: Primary | ICD-10-CM

## 2018-10-02 DIAGNOSIS — S23.3XXD SPRAIN OF LIGAMENTS OF THORACIC SPINE, SUBSEQUENT ENCOUNTER: ICD-10-CM

## 2018-10-02 DIAGNOSIS — S23.41XD RIB SPRAIN, SUBSEQUENT ENCOUNTER: ICD-10-CM

## 2018-10-02 PROCEDURE — 71101 X-RAY EXAM UNILAT RIBS/CHEST: CPT

## 2018-10-02 PROCEDURE — 72072 X-RAY EXAM THORAC SPINE 3VWS: CPT

## 2020-12-28 ENCOUNTER — NURSE TRIAGE (OUTPATIENT)
Dept: OTHER | Facility: OTHER | Age: 55
End: 2020-12-28

## 2020-12-28 DIAGNOSIS — Z20.828 SARS-ASSOCIATED CORONAVIRUS EXPOSURE: Primary | ICD-10-CM

## 2020-12-28 DIAGNOSIS — Z20.828 SARS-ASSOCIATED CORONAVIRUS EXPOSURE: ICD-10-CM

## 2020-12-28 LAB — SARS-COV-2 RNA RESP QL NAA+PROBE: NEGATIVE

## 2020-12-28 PROCEDURE — 87635 SARS-COV-2 COVID-19 AMP PRB: CPT | Performed by: FAMILY MEDICINE

## 2021-01-17 ENCOUNTER — OFFICE VISIT (OUTPATIENT)
Dept: URGENT CARE | Age: 56
End: 2021-01-17
Payer: COMMERCIAL

## 2021-01-17 VITALS
RESPIRATION RATE: 18 BRPM | SYSTOLIC BLOOD PRESSURE: 158 MMHG | OXYGEN SATURATION: 98 % | WEIGHT: 310 LBS | DIASTOLIC BLOOD PRESSURE: 88 MMHG | BODY MASS INDEX: 43.4 KG/M2 | HEART RATE: 75 BPM | HEIGHT: 71 IN | TEMPERATURE: 97.4 F

## 2021-01-17 DIAGNOSIS — H10.32 ACUTE BACTERIAL CONJUNCTIVITIS OF LEFT EYE: Primary | ICD-10-CM

## 2021-01-17 PROCEDURE — 99203 OFFICE O/P NEW LOW 30 MIN: CPT | Performed by: PHYSICIAN ASSISTANT

## 2021-01-17 RX ORDER — DICLOFENAC SODIUM 75 MG/1
75 TABLET, DELAYED RELEASE ORAL EVERY 12 HOURS
COMMUNITY
Start: 2020-10-26

## 2021-01-17 RX ORDER — LEVOTHYROXINE SODIUM 88 UG/1
CAPSULE ORAL
COMMUNITY

## 2021-01-17 RX ORDER — FOLIC ACID 1 MG/1
TABLET ORAL
COMMUNITY
Start: 2021-01-04

## 2021-01-17 RX ORDER — ARMODAFINIL 250 MG/1
250 TABLET ORAL DAILY
COMMUNITY
Start: 2020-10-01

## 2021-01-17 RX ORDER — DICLOFENAC SODIUM 75 MG/1
75 TABLET, DELAYED RELEASE ORAL DAILY
COMMUNITY
End: 2021-01-17 | Stop reason: SDUPTHER

## 2021-01-17 RX ORDER — CIPROFLOXACIN HYDROCHLORIDE 3.5 MG/ML
1 SOLUTION/ DROPS TOPICAL
Qty: 1.8 ML | Refills: 0 | Status: SHIPPED | OUTPATIENT
Start: 2021-01-17 | End: 2021-01-24

## 2021-01-17 RX ORDER — IBUPROFEN 600 MG/1
600 TABLET ORAL
COMMUNITY

## 2021-01-17 NOTE — PATIENT INSTRUCTIONS

## 2021-01-17 NOTE — PROGRESS NOTES
[facility]        NAME: Amor Morales is a 60 y o  male  : 1965    MRN: [id number]  DATE: 2021  TIME: 10:59 AM    Assessment and Plan   Acute bacterial conjunctivitis of left eye [H10 32]  1  Acute bacterial conjunctivitis of left eye  ciprofloxacin (CILOXAN) 0 3 % ophthalmic solution         Patient Instructions       Follow up with PCP in 3-5 days  Proceed to  ER if symptoms worsen  Chief Complaint     Chief Complaint   Patient presents with    Eye Pain     left eye pain swollen since Friday  History of Present Illness         Patient started with the left eye irritation and drainage on Friday  It has been getting worse  His left thigh is swollen and red  He he denies any contact use  Review of Systems   Review of Systems   Constitutional: Negative  HENT: Negative  Eyes: Positive for pain, discharge, redness and itching  Negative for photophobia and visual disturbance  Respiratory: Negative  Cardiovascular: Negative  Gastrointestinal: Negative  Musculoskeletal: Negative  Neurological: Negative  Psychiatric/Behavioral: Negative            Current Medications       Current Outpatient Medications:     Armodafinil 250 MG tablet, Take 250 mg by mouth daily, Disp: , Rfl:     diclofenac (VOLTAREN) 75 mg EC tablet, Take 75 mg by mouth every 12 (twelve) hours, Disp: , Rfl:     fluticasone (FLONASE) 50 mcg/act nasal spray, 1 spray into each nostril as needed for rhinitis, Disp: , Rfl:     fluticasone-salmeterol (ADVAIR) 250-50 mcg/dose inhaler, Inhale 1 puff daily in the early morning, Disp: , Rfl:     folic acid (FOLVITE) 1 mg tablet, , Disp: , Rfl:     furosemide (LASIX) 20 mg tablet, Take 20 mg by mouth daily in the early morning, Disp: , Rfl:     ibuprofen (MOTRIN) 600 mg tablet, Take 600 mg by mouth, Disp: , Rfl:     montelukast (SINGULAIR) 10 mg tablet, TAKE 1 TABLET BY MOUTH ONCE DAILY, Disp: 90 tablet, Rfl: 3    Multiple Vitamins-Minerals (MULTIVITAMIN GUMMIES MENS PO), Take 1 tablet by mouth daily, Disp: , Rfl:     multivitamin (THERAGRAN) TABS, Take 1 tablet by mouth daily, Disp: , Rfl:     Testosterone Cypionate & Prop (TESTOSTERONE EO-PRO- IM), Inject into the shoulder, thigh, or buttocks once a week Takes on Friday, Disp: , Rfl:     zolpidem (AMBIEN) 10 mg tablet, Take 10 mg by mouth daily at bedtime as needed for sleep, Disp: , Rfl:     albuterol (PROVENTIL HFA,VENTOLIN HFA) 90 mcg/act inhaler, Inhale 2 puffs every 6 (six) hours as needed for wheezing, Disp: , Rfl:     Cholecalciferol (VITAMIN D PO), Take by mouth, Disp: , Rfl:     ciprofloxacin (CILOXAN) 0 3 % ophthalmic solution, Administer 1 drop into the left eye every 4 (four) hours while awake for 7 days, Disp: 1 8 mL, Rfl: 0    Current Allergies     Allergies as of 01/17/2021 - Reviewed 01/17/2021   Allergen Reaction Noted    Other Shortness Of Breath 01/04/2018            The following portions of the patient's history were reviewed and updated as appropriate: allergies, current medications, past family history, past medical history, past social history, past surgical history and problem list      Past Medical History:   Diagnosis Date    Asthma     CPAP (continuous positive airway pressure) dependence     History of transfusion     age 3    Obese     Sleep apnea        Past Surgical History:   Procedure Laterality Date    CHOLECYSTECTOMY      HERNIA REPAIR Bilateral     NM COLONOSCOPY FLX DX W/COLLJ SPEC WHEN PFRMD N/A 1/4/2018    Procedure: COLONOSCOPY;  Surgeon: Clarence Dooley MD;  Location: [facility]; Service: Gastroenterology       Family History   Problem Relation Age of Onset    Cancer Mother     Colon polyps Father     Cancer Sister          Medications have been verified          Objective   /88 (BP Location: Right arm, Patient Position: Sitting, Cuff Size: Extra-Large)   Pulse 75   Temp (!) 97 4 °F (36 3 °C) (Temporal)   Resp 18   Ht 5' 11" (1 803 m)   Wt (!) 141 kg (310 lb)   SpO2 98%   BMI 43 24 kg/m²        Physical Exam     Physical Exam  Vitals signs and nursing note reviewed  Constitutional:       General: He is not in acute distress  Appearance: Normal appearance  He is not ill-appearing, toxic-appearing or diaphoretic  HENT:      Head: Normocephalic and atraumatic  Eyes:      General:         Right eye: No discharge  Left eye: No discharge  Extraocular Movements: Extraocular movements intact  Pupils: Pupils are equal, round, and reactive to light  Comments:   Left conjunctiva with erythema  Neurological:      General: No focal deficit present  Mental Status: He is alert and oriented to person, place, and time     Psychiatric:         Mood and Affect: Mood normal          Behavior: Behavior normal

## 2021-10-28 ENCOUNTER — OFFICE VISIT (OUTPATIENT)
Dept: GASTROENTEROLOGY | Facility: CLINIC | Age: 56
End: 2021-10-28
Payer: COMMERCIAL

## 2021-10-28 VITALS
HEART RATE: 88 BPM | WEIGHT: 315 LBS | DIASTOLIC BLOOD PRESSURE: 86 MMHG | BODY MASS INDEX: 44.1 KG/M2 | HEIGHT: 71 IN | SYSTOLIC BLOOD PRESSURE: 134 MMHG

## 2021-10-28 DIAGNOSIS — R10.84 GENERALIZED ABDOMINAL PAIN: ICD-10-CM

## 2021-10-28 DIAGNOSIS — R13.19 ESOPHAGEAL DYSPHAGIA: Primary | ICD-10-CM

## 2021-10-28 DIAGNOSIS — K59.1 FUNCTIONAL DIARRHEA: ICD-10-CM

## 2021-10-28 PROCEDURE — 99204 OFFICE O/P NEW MOD 45 MIN: CPT | Performed by: INTERNAL MEDICINE

## 2021-10-28 RX ORDER — HYDROCORTISONE 25 MG/G
CREAM TOPICAL 2 TIMES DAILY
Qty: 28 G | Refills: 0 | Status: SHIPPED | OUTPATIENT
Start: 2021-10-28

## 2021-10-28 RX ORDER — SILDENAFIL CITRATE 20 MG/1
20 TABLET ORAL DAILY
COMMUNITY
Start: 2021-08-28

## 2021-10-28 RX ORDER — LEUCOVORIN CALCIUM 10 MG/1
TABLET ORAL
COMMUNITY
Start: 2021-09-15

## 2021-10-28 RX ORDER — PANTOPRAZOLE SODIUM 40 MG/1
40 TABLET, DELAYED RELEASE ORAL DAILY
Qty: 30 TABLET | Refills: 2 | Status: SHIPPED | OUTPATIENT
Start: 2021-10-28 | End: 2021-11-10 | Stop reason: SDUPTHER

## 2021-11-04 ENCOUNTER — TELEPHONE (OUTPATIENT)
Dept: GASTROENTEROLOGY | Facility: CLINIC | Age: 56
End: 2021-11-04

## 2021-11-09 ENCOUNTER — TELEPHONE (OUTPATIENT)
Dept: GASTROENTEROLOGY | Facility: HOSPITAL | Age: 56
End: 2021-11-09

## 2021-11-10 ENCOUNTER — ANESTHESIA EVENT (OUTPATIENT)
Dept: GASTROENTEROLOGY | Facility: HOSPITAL | Age: 56
End: 2021-11-10

## 2021-11-10 ENCOUNTER — HOSPITAL ENCOUNTER (OUTPATIENT)
Dept: GASTROENTEROLOGY | Facility: HOSPITAL | Age: 56
Setting detail: OUTPATIENT SURGERY
Discharge: HOME | End: 2021-11-10
Attending: INTERNAL MEDICINE
Payer: COMMERCIAL

## 2021-11-10 ENCOUNTER — ANESTHESIA (OUTPATIENT)
Dept: GASTROENTEROLOGY | Facility: HOSPITAL | Age: 56
End: 2021-11-10

## 2021-11-10 VITALS
WEIGHT: 315 LBS | TEMPERATURE: 97.3 F | SYSTOLIC BLOOD PRESSURE: 127 MMHG | BODY MASS INDEX: 44.1 KG/M2 | HEIGHT: 71 IN | DIASTOLIC BLOOD PRESSURE: 76 MMHG | HEART RATE: 68 BPM | OXYGEN SATURATION: 97 % | RESPIRATION RATE: 23 BRPM

## 2021-11-10 DIAGNOSIS — R10.84 GENERALIZED ABDOMINAL PAIN: ICD-10-CM

## 2021-11-10 DIAGNOSIS — K59.1 FUNCTIONAL DIARRHEA: ICD-10-CM

## 2021-11-10 DIAGNOSIS — R13.19 ESOPHAGEAL DYSPHAGIA: ICD-10-CM

## 2021-11-10 PROBLEM — M06.9 RHEUMATOID ARTHRITIS (HCC): Status: ACTIVE | Noted: 2021-11-10

## 2021-11-10 PROBLEM — E03.9 HYPOTHYROIDISM: Status: ACTIVE | Noted: 2021-11-10

## 2021-11-10 PROBLEM — G47.30 SLEEP APNEA: Status: ACTIVE | Noted: 2021-11-10

## 2021-11-10 PROCEDURE — 43239 EGD BIOPSY SINGLE/MULTIPLE: CPT | Performed by: INTERNAL MEDICINE

## 2021-11-10 PROCEDURE — 88305 TISSUE EXAM BY PATHOLOGIST: CPT | Performed by: PATHOLOGY

## 2021-11-10 PROCEDURE — 88312 SPECIAL STAINS GROUP 1: CPT | Performed by: PATHOLOGY

## 2021-11-10 PROCEDURE — 45380 COLONOSCOPY AND BIOPSY: CPT | Performed by: INTERNAL MEDICINE

## 2021-11-10 PROCEDURE — NC001 PR NO CHARGE: Performed by: INTERNAL MEDICINE

## 2021-11-10 RX ORDER — SODIUM CHLORIDE, SODIUM LACTATE, POTASSIUM CHLORIDE, CALCIUM CHLORIDE 600; 310; 30; 20 MG/100ML; MG/100ML; MG/100ML; MG/100ML
INJECTION, SOLUTION INTRAVENOUS CONTINUOUS PRN
Status: DISCONTINUED | OUTPATIENT
Start: 2021-11-10 | End: 2021-11-10

## 2021-11-10 RX ORDER — LIDOCAINE HYDROCHLORIDE 20 MG/ML
INJECTION, SOLUTION EPIDURAL; INFILTRATION; INTRACAUDAL; PERINEURAL AS NEEDED
Status: DISCONTINUED | OUTPATIENT
Start: 2021-11-10 | End: 2021-11-10

## 2021-11-10 RX ORDER — PROPOFOL 10 MG/ML
INJECTION, EMULSION INTRAVENOUS AS NEEDED
Status: DISCONTINUED | OUTPATIENT
Start: 2021-11-10 | End: 2021-11-10

## 2021-11-10 RX ORDER — PANTOPRAZOLE SODIUM 40 MG/1
40 TABLET, DELAYED RELEASE ORAL 2 TIMES DAILY
Qty: 60 TABLET | Refills: 2 | Status: SHIPPED | OUTPATIENT
Start: 2021-11-10 | End: 2022-07-28 | Stop reason: SDUPTHER

## 2021-11-10 RX ORDER — SODIUM CHLORIDE, SODIUM LACTATE, POTASSIUM CHLORIDE, CALCIUM CHLORIDE 600; 310; 30; 20 MG/100ML; MG/100ML; MG/100ML; MG/100ML
125 INJECTION, SOLUTION INTRAVENOUS CONTINUOUS
Status: DISCONTINUED | OUTPATIENT
Start: 2021-11-10 | End: 2021-11-14 | Stop reason: HOSPADM

## 2021-11-10 RX ORDER — PANTOPRAZOLE SODIUM 40 MG/1
40 TABLET, DELAYED RELEASE ORAL 2 TIMES DAILY
Qty: 60 TABLET | Refills: 2 | Status: SHIPPED | OUTPATIENT
Start: 2021-11-10 | End: 2022-02-04 | Stop reason: SDUPTHER

## 2021-11-10 RX ORDER — CHOLESTYRAMINE 4 G/9G
1 POWDER, FOR SUSPENSION ORAL 2 TIMES DAILY WITH MEALS
Qty: 60 PACKET | Refills: 2 | Status: SHIPPED | OUTPATIENT
Start: 2021-11-10

## 2021-11-10 RX ORDER — CHOLESTYRAMINE 4 G/9G
1 POWDER, FOR SUSPENSION ORAL 2 TIMES DAILY WITH MEALS
Qty: 60 PACKET | Refills: 3 | Status: SHIPPED | OUTPATIENT
Start: 2021-11-10

## 2021-11-10 RX ADMIN — PROPOFOL 40 MG: 10 INJECTION, EMULSION INTRAVENOUS at 10:16

## 2021-11-10 RX ADMIN — LIDOCAINE HYDROCHLORIDE 200 MG: 20 INJECTION, SOLUTION EPIDURAL; INFILTRATION; INTRACAUDAL; PERINEURAL at 10:13

## 2021-11-10 RX ADMIN — SODIUM CHLORIDE, SODIUM LACTATE, POTASSIUM CHLORIDE, AND CALCIUM CHLORIDE: .6; .31; .03; .02 INJECTION, SOLUTION INTRAVENOUS at 10:09

## 2021-11-10 RX ADMIN — SODIUM CHLORIDE, SODIUM LACTATE, POTASSIUM CHLORIDE, AND CALCIUM CHLORIDE 125 ML/HR: .6; .31; .03; .02 INJECTION, SOLUTION INTRAVENOUS at 09:16

## 2021-11-10 RX ADMIN — PROPOFOL 40 MG: 10 INJECTION, EMULSION INTRAVENOUS at 10:24

## 2021-11-10 RX ADMIN — PROPOFOL 30 MG: 10 INJECTION, EMULSION INTRAVENOUS at 10:18

## 2021-11-10 RX ADMIN — PROPOFOL 130 MG: 10 INJECTION, EMULSION INTRAVENOUS at 10:13

## 2021-11-10 RX ADMIN — PROPOFOL 40 MG: 10 INJECTION, EMULSION INTRAVENOUS at 10:21

## 2021-11-10 RX ADMIN — PROPOFOL 40 MG: 10 INJECTION, EMULSION INTRAVENOUS at 10:27

## 2021-11-15 ENCOUNTER — TELEPHONE (OUTPATIENT)
Dept: GASTROENTEROLOGY | Facility: CLINIC | Age: 56
End: 2021-11-15

## 2021-11-16 DIAGNOSIS — B37.81 CANDIDA ESOPHAGITIS (HCC): ICD-10-CM

## 2021-11-16 RX ORDER — FLUCONAZOLE 100 MG/1
TABLET ORAL
Qty: 15 TABLET | Refills: 0 | Status: SHIPPED | OUTPATIENT
Start: 2021-11-16 | End: 2021-11-30

## 2022-02-03 ENCOUNTER — TELEPHONE (OUTPATIENT)
Dept: GASTROENTEROLOGY | Facility: CLINIC | Age: 57
End: 2022-02-03

## 2022-02-03 NOTE — TELEPHONE ENCOUNTER
Sourav Lopez pt-  RX: Pantoprazole 40 mg/ refills  Uses: [facility] [phone]  Please phone [phone] if, any questions  Chandu Peterson

## 2022-07-28 DIAGNOSIS — K59.1 FUNCTIONAL DIARRHEA: ICD-10-CM

## 2022-07-28 DIAGNOSIS — R13.19 ESOPHAGEAL DYSPHAGIA: ICD-10-CM

## 2022-07-28 RX ORDER — PANTOPRAZOLE SODIUM 40 MG/1
40 TABLET, DELAYED RELEASE ORAL 2 TIMES DAILY
Qty: 60 TABLET | Refills: 2 | Status: SHIPPED | OUTPATIENT
Start: 2022-07-28

## 2022-09-15 ENCOUNTER — OFFICE VISIT (OUTPATIENT)
Dept: SLEEP CENTER | Facility: CLINIC | Age: 57
End: 2022-09-15
Payer: COMMERCIAL

## 2022-09-15 VITALS
BODY MASS INDEX: 44.1 KG/M2 | DIASTOLIC BLOOD PRESSURE: 86 MMHG | HEART RATE: 80 BPM | HEIGHT: 71 IN | WEIGHT: 315 LBS | SYSTOLIC BLOOD PRESSURE: 142 MMHG

## 2022-09-15 DIAGNOSIS — G47.10 HYPERSOMNIA: ICD-10-CM

## 2022-09-15 DIAGNOSIS — E66.01 MORBID OBESITY (HCC): ICD-10-CM

## 2022-09-15 DIAGNOSIS — G47.33 OSA ON CPAP: Primary | ICD-10-CM

## 2022-09-15 DIAGNOSIS — G47.61 PLMD (PERIODIC LIMB MOVEMENT DISORDER): ICD-10-CM

## 2022-09-15 DIAGNOSIS — F51.01 PRIMARY INSOMNIA: ICD-10-CM

## 2022-09-15 DIAGNOSIS — Z99.89 OSA ON CPAP: Primary | ICD-10-CM

## 2022-09-15 PROCEDURE — 99204 OFFICE O/P NEW MOD 45 MIN: CPT | Performed by: PHYSICIAN ASSISTANT

## 2022-09-15 RX ORDER — DULOXETIN HYDROCHLORIDE 30 MG/1
30 CAPSULE, DELAYED RELEASE ORAL DAILY
COMMUNITY

## 2022-09-15 RX ORDER — LEVOTHYROXINE SODIUM 88 UG/1
88 TABLET ORAL DAILY
COMMUNITY
Start: 2022-09-12

## 2022-09-15 RX ORDER — ADALIMUMAB 40MG/0.4ML
40 KIT SUBCUTANEOUS
COMMUNITY
Start: 2022-09-06

## 2022-09-15 NOTE — PATIENT INSTRUCTIONS
Get your split night sleep study as soon as possible  The plan would be to order you a new CPAP machine as your current CPAP machine is 8to 15years old  We would be able to set the CPAP at the proper settings after obtaining the split night sleep study  We would then need to follow you back 31-91 days after starting your new machine  Continue to follow with your PCP in regards to your armodafinil and Ambien  You could try to wean yourself off the Ambien by starting with 7 5 mg a night for 2 weeks then trying 5 mg at night for 2 weeks then trying 2 5 mg for 2 weeks and eventually stopping the medication  Please check with your PCP to see if he is okay with you stopping the medicine as well  With great difficulty falling asleep or with difficulty falling back asleep, laying in bed for a prolonged period time is not ideal   This can increase worry and rumination (having racing thoughts, not able to "turn off your brain"  After what feels like 15 minutes, if you feel wide awake, worried, anxious, or can't clear your brain, it is better to leave the bedroom to do something relaxing , The typical recommendation is to read a boring book in dim light  In general, if you leave the room, you want to do something that is relaxing, not stimulating  Avoid bright screens, doing work, doing chores, or anything that requires a lot of mental effort  Return to bed when you feel more calm, sleepy, or mentally clear  Keep a consistent bedtime and wake up time  This will lead to a more regular sleep schedule and avoid periods of sleep deprivation or periods of extended wakefulness during the night  Avoid napping, especially naps lasting longer than 1 hour or naps late in the day, which will likely affect your ability to fall asleep that night      Limit caffeine, avoiding caffeine after lunch to allow it to get out of your system and not affect your ability to fall asleep or the quality of your sleep    Limit alcohol, alcohol can be sedating but also activating as it metabolizes, causing you to awaken from sleep  It can affect the quality of your sleep by not letting you get into the more refreshing stages of sleep  Avoid nicotine, of course not smoking or vaping at all is best, but nicotine is a stimulant and should be avoided near bedtime and during the night    Exercise, Daytime physical activity is encouraged, in particular 4-6 hours before bedtime, as this may help you to fall asleep more easily and quality of sleep is improved  Rigorous exercise within 2 hours of bedtime is discouraged  Keep the sleep environment quiet and dark - Noise and light exposure during the night can disrupt sleep  White noise or ear plugs are often recommended to reduce noise  Using black out shades or an eye mask is commonly recommended to reduce light  This also includes avoiding exposure to television or technology near bedtime, as this can have an impact on circadian rhythms by shifting sleep time later  Bedroom clock - Avoid checking the time at night  This includes alarm clocks and other time pieces such as watches and phones  Checking the time increases cognitive arousal and prolongs wakefulness  Evening eating - Avoid a large meal near bedtime, but don't go to bed hungry  Eat a healthy and filling meal in the evening without over-eating and avoid late night snacks  Nursing Support:  When: Monday through Friday 7A-5PM except holidays  Where: Our direct line is [phone]  If you are having a true emergency please call 911  In the event that the line is busy or it is after hours please leave a voice message and we will return your call  Please speak clearly, leaving your full name, birth date, best number to reach you and the reason for your call  Medication refills:  We will need the name of the medication, the dosage, the ordering provider, whether you get a 30 or 90 day refill, and the pharmacy name and address  Medications will be ordered by the provider only  Nurses cannot call in prescriptions  Please allow 7 days for medication refills  Physician requested updates: If your provider requested that you call with an update after starting medication, please be ready to provide us the medication and dosage, what time you take your medication, the time you attempt to fall asleep, time you fall asleep, when you wake up, and what time you get out of bed  Sleep Study Results: We will contact you with sleep study results and/or next steps after the physician has reviewed your testing

## 2022-09-15 NOTE — ASSESSMENT & PLAN NOTE
The patient currently does not feel this is an issue for him  He was unable to tolerate pramipexole    We will see with the new sleep study shows in regards to his PLMD

## 2022-09-15 NOTE — ASSESSMENT & PLAN NOTE
I was able to review previous notes from his sleep specialist at [facility]   I was not able to view an actual copy of his sleep study  It was noted by his previously specialist that he has severe obstructive sleep apnea which was initially diagnosed in 2005  He had a CPAP titration study 08/19/2014 which showed most effective setting to be 12 cm of H2O  At that time, the patient states he weighed at least 50 lb less then he does currently  His sleep specialist then tried to order him a new CPAP machine on 06/03/2021 but that was denied by the insurance per the patient  He is still using his old machine which he estimates to be 8to 15years old  He recently increased the pressure to 13 5 cm of H2O as he felt the pressure was not strong enough  His most recent compliance report showed his AHI at 2 2  I ordered a split night study in order to reassess his sleep apnea and most effective pressure setting, since he had a significant weight gain and I do not have access to any previous studies  Also, the patient should get a new machine as his CPAP is greater than 8years old  After I get the results from his sleep study, I will plan to order him a new CPAP machine  Will then follow back with him 31-91 days after he started using the new machine

## 2022-09-15 NOTE — ASSESSMENT & PLAN NOTE
Patient is currently taking armodafinil 250 mg a half a pill daily  He is functioning well  He will continue to take this as prescribed by his PCP

## 2022-09-15 NOTE — PROGRESS NOTES
Consultation - [address], 1965, MRN: [id number]    9/15/2022        Reason for Consult / Principal Problem:    Obstructive Sleep Apnea  Insomnia  PLMD  Hypersomnia     Thank you for the opportunity of participating in the evaluation and care of this patient in the Sleep Clinic at [facility]  Subjective:     HPI: Tito Sal is a 59y o  year old morbidly obese male with a past medical history of severe obstructive sleep apnea, primary insomnia, periodic limb movement disorder, and hypersomnia who presents to us to establish new care after his sleep medicine doctor retired  Patient states he was originally diagnosed with obstructive sleep apnea in 2005  He states he has been using his CPAP ever since  He states he still has his original machine  His sleep specialist ordered him a new CPAP machine in June of 2021, but the patient states his insurance would not cover it so he continued using his old machine  As far as he knows, it is not broke, but it does not transmit compliance reports without using an SD card  He states over the past year he is gained 50 lb and felt that his pressure on his machine was not strong enough  He manually changed his pressure to 13 5 cm of H2O  He is currently using a nasal mask  His last sleep study was noted to be a CPAP study that was done 08/19/2014, and it was noted that 12 cm of H2O was the optimal setting to control his sleep apnea  His AHI was noted to be 0 7 at that setting  Patient is currently getting all of his supplies through [employer] as he finds it to be cheaper than using his insurance and a DME supplier  He was also noted to have periodic limb movement disorder on his sleep study  It was also noted that was CPAP treatment his limb movement disorder persisted  Patient states that himself and his wife are both unaware that he has limb movement disorder    He states it does not bother him at all  Patient states he has been suffering from insomnia for years  He has been taking Ambien 10 mg at bedtime for the past 12 years  He states after taking the Ambien he is able to sleep within 15 minutes  He was sleeping 5-6 hours per night up until the last 2 and half weeks began waking up after 2-1/2 hours asleep and is having difficulty falling back to sleep  He states some nights he does not go back to sleep at all and some nights he will fall back to sleep around 5:00 a m  for approximately 2 hours  He states he did try doxepin in the past but had what he described as loop nightmares with the doxepin  He states he stopped the medication because of the side effects  Patient states he was suffering from hypersomnia and does still feel sleepy during the day but with the armodafinil 250 mg half a pill daily in the morning he feels he is able to at least function      Comorbid conditions:  Asthma, obesity, rheumatoid arthritis, neuropathy, depression    Review of Systems      Genitourinary difficulty with erection   Cardiology palpitations/fluttering feeling in the chest and ankle/leg swelling   Gastrointestinal none   Neurology need to move extremities, muscle weakness, numbness/tingling of an extremity, forgetfulness, difficulty with memory and balance problems   Constitutional claustrophobia, fatigue and weight change   Integumentary rash or dry skin   Psychiatry anxiety, aggressiveness or irritability and mood change   Musculoskeletal joint pain, muscle aches, back pain and leg cramps   Pulmonary shortness of breath with activity, wheezing and frequent cough   ENT throat clearing and ringing in ears   Endocrine frequent urination   Hematological none     Sleep Study Results:  Sleep study 08/12/2014 showed severe sleep apnea and PLMD was noted, 08/19/2014 CPAP study was done which showed 12 cm of H2O to be most effective in treating his sleep apnea with a resulting AHI of 0  7    CPAP Equipment:  Equipment set up date:  3146-3114, at least 15years old   PAP Pressure: Nasal CPAP set to deliver 13 cm of water pressure setting was at 12, this was manually changed by the patient    Type of mask used: nasal  DME Provider: unknown, getting supplies through [employer]     Employment:  Self-employed, [employer] and [employer] works evenings, drives 15 minutes to work, no drowsy driving,  no CDL    Sleep Schedule:       Bedtime:  Midnight, takes Ambien 10 mg as getting into bed, up until the last 2 5 and weeks he used to sleep 5-6 hours and felt somewhat rested, in the last 2 and half weeks he has been waking after 2 5 hours and has had extreme difficulty in returning to sleep, if he does he will sleep maybe another 2 hours      Latency:  15  Minutes       Wakeup time:  7:30 a m  Awakenings:       Frequency:  3-4 times or sometimes stays up       Causes:  Unknown       Duration:  Few minutes to not sleeping rest of night     Daytime Sleepiness / Inappropriate Sleep:       Most severe:  From 9:30 a m  throughout the day      Naps :  He tries but is unable        Inappropriate drowsiness / sleep:  None     Snoring:  Not snoring with CPAP     Apnea:  None with CPAP     Change in Weight:  Gained 50 pounds over last year, highest weight ever currently     Restless Leg Syndrome:  No clinical symptoms consistent with this diagnosis     Other Complaints:  No reports of sleep walking, sleep talking, sleep paralysis or hallucinations surrounding sleep +nightmares, not recently, worsened with Doxepin  Denies waking up with headaches  + bruxism + dry mouth  Social History:      Caffeine:  8 oz of coffee in a m  Tobacco:   reports that he has never smoked  He has never used smokeless tobacco      E-cig/Vaping:    E-Cigarette/Vaping      E-Cigarette/Vaping Substances         Alcohol:   reports current alcohol use of about 7 0 standard drinks of alcohol per week   socially      Drugs:   reports no history of drug use  The review of systems and following portions of the patient's history were reviewed and updated as appropriate: allergies, current medications, past family history, past medical history, past social history, past surgical history and problem list         Objective:       Vitals:    09/15/22 1418   BP: 142/86   BP Location: Left arm   Patient Position: Sitting   Cuff Size: Large   Pulse: 80   Weight: (!) 147 kg (323 lb)   Height: 5' 11" (1 803 m)     Body mass index is 45 05 kg/m²  [place] Sleepiness Scale: Total score: 6      Current Outpatient Medications:     Adalimumab (HUMIRA SC), Inject under the skin every 14 (fourteen) days, Disp: , Rfl:     albuterol (PROVENTIL HFA,VENTOLIN HFA) 90 mcg/act inhaler, Inhale 2 puffs every 6 (six) hours as needed for wheezing, Disp: , Rfl:     Armodafinil 250 MG tablet, Take 250 mg by mouth daily Patient taking 1/2 of 250mg, Disp: , Rfl:     DULoxetine (CYMBALTA) 30 mg delayed release capsule, Take 30 mg by mouth daily Taking 2 30mg daily  , Disp: , Rfl:     fluticasone (FLONASE) 50 mcg/act nasal spray, 1 spray into each nostril as needed for rhinitis, Disp: , Rfl:     fluticasone-salmeterol (ADVAIR) 250-50 mcg/dose inhaler, Inhale 1 puff daily in the early morning, Disp: , Rfl:     furosemide (LASIX) 20 mg tablet, Take 20 mg by mouth as needed, Disp: , Rfl:     Humira Pen 40 MG/0 4ML PNKT, Inject 40 mg into a muscle every 14 (fourteen) days, Disp: , Rfl:     ibuprofen (MOTRIN) 600 mg tablet, Take 600 mg by mouth as needed, Disp: , Rfl:     leucovorin (WELLCOVORIN) 10 MG tablet, Once weekly, Disp: , Rfl:     Levothyroxine Sodium 88 MCG CAPS, Take by mouth, Disp: , Rfl:     methotrexate 2 5 mg tablet, Take by mouth once a week 2 5 5 pills once a week, Disp: , Rfl:     montelukast (SINGULAIR) 10 mg tablet, TAKE 1 TABLET BY MOUTH ONCE DAILY, Disp: 90 tablet, Rfl: 3    Multiple Vitamins-Minerals (MULTIVITAMIN GUMMIES MENS PO), Take 1 tablet by mouth daily, Disp: , Rfl:     multivitamin (THERAGRAN) TABS, Take 1 tablet by mouth daily, Disp: , Rfl:     pantoprazole (PROTONIX) 40 mg tablet, Take 1 tablet (40 mg total) by mouth 2 (two) times a day, Disp: 60 tablet, Rfl: 2    pantoprazole (PROTONIX) 40 mg tablet, Take 1 tablet (40 mg total) by mouth 2 (two) times a day, Disp: 60 tablet, Rfl: 2    sildenafil (REVATIO) 20 mg tablet, Take 20 mg by mouth as needed, Disp: , Rfl:     zolpidem (AMBIEN) 10 mg tablet, Take 10 mg by mouth daily at bedtime as needed for sleep, Disp: , Rfl:     Cholecalciferol (VITAMIN D PO), Take by mouth (Patient not taking: Reported on 9/15/2022), Disp: , Rfl:     Euthyrox 88 MCG tablet, Take 88 mcg by mouth daily (Patient not taking: Reported on 9/15/2022), Disp: , Rfl:     folic acid (FOLVITE) 1 mg tablet, , Disp: , Rfl:     hydrocortisone (ANUSOL-HC) 2 5 % rectal cream, Apply topically 2 (two) times a day (Patient not taking: Reported on 9/15/2022), Disp: 28 g, Rfl: 0    Physical Exam  General Appearance:   Alert, cooperative, no distress, appears stated age, obese     Head:   Normocephalic, without obvious abnormality, atraumatic     Eyes:   left eye PERRL, conjunctiva/corneas clear, right eye blindness          Nose:  Nares normal, septum midline, mucosa normal, no drainage or sinus tenderness           Throat:  Lips, teeth, patient has a permanent retainer with his 2 front teeth as he lost them in an accident as a child,  gums normal; tongue normal in size and midline in position; mucosa moist, low-lying soft palate uvula normal, tonsils absent, Mallampati class 4       Neck:  Supple, symmetrical, trachea midline, no adenopathy; no thyromegaly noted, no carotid bruit or JVD     Lungs:      Clear to auscultation bilaterally, respirations unlabored     Heart:   Regular rate and rhythm, S1 and S2 normal, no murmur, rub or gallop       Extremities:  Extremities normal, atraumatic, no cyanosis or edema       Skin: Skin color, texture, turgor normal, no rashes or lesions       Neurologic:  No focal deficits noted  ASSESSMENT / PLAN     1  ARNULFO on CPAP  Assessment & Plan:  I was able to review previous notes from his sleep specialist at [facility]   I was not able to view an actual copy of his sleep study  It was noted by his previously specialist that he has severe obstructive sleep apnea which was initially diagnosed in 2005  He had a CPAP titration study 08/19/2014 which showed most effective setting to be 12 cm of H2O  At that time, the patient states he weighed at least 50 lb less then he does currently  His sleep specialist then tried to order him a new CPAP machine on 06/03/2021 but that was denied by the insurance per the patient  He is still using his old machine which he estimates to be 8to 15years old  He recently increased the pressure to 13 5 cm of H2O as he felt the pressure was not strong enough  His most recent compliance report showed his AHI at 2 2  I ordered a split night study in order to reassess his sleep apnea and most effective pressure setting, since he had a significant weight gain and I do not have access to any previous studies  Also, the patient should get a new machine as his CPAP is greater than 8years old  After I get the results from his sleep study, I will plan to order him a new CPAP machine  Will then follow back with him 31-91 days after he started using the new machine  Orders:  -     Split Study; Future; Expected date: 09/15/2022    2  PLMD (periodic limb movement disorder)  Assessment & Plan:  The patient currently does not feel this is an issue for him  He was unable to tolerate pramipexole  We will see with the new sleep study shows in regards to his PLMD     Orders:  -     Split Study; Future; Expected date: 09/15/2022    3  Hypersomnia  Assessment & Plan:  Patient is currently taking armodafinil 250 mg a half a pill daily    He is functioning well   He will continue to take this as prescribed by his PCP  4  Primary insomnia  Assessment & Plan:  Patient is currently taking zolpidem 10 mg nightly as he has for the past 12 years  He actually does not feel it is not helpful  I advised him he could discuss with his PCP about weaning off the medication as his PCP is prescribing it currently  I suggested he may try 7 5 mg 2 weeks then 5 mg for 2 weeks and 2 5 mg and eventually stopping the medication  Patient states he tried doxepin in the past but due to side effects of recurring nightmares he was unable to tolerate the medication  5  Morbid obesity ([address] )  Assessment & Plan:  I advised the patient to work on weight loss as this could contribute to overall better health and also improve his sleep apnea  Orders:  -     Split Study; Future; Expected date: 09/15/2022     Today I reviewed the patient's compliance data  He has been able to use the equipment 90 % of all days recorded  Average usage was 4 or more hours 60 % of all days recorded  The patient uses the equipment for an average of four hours and 37 minutes per night  The estimated AHI is 2 2 abnormal breathing events per hour  The patient feels he benefits from the use of the equipment and would like to continue PAP treatment  A prescription for supplies has not been provided to last for the next year per the patient  He is currently getting his supplies from [employer] which he finds to be cheaper than going through his insurance company  He will continue using this equipment at the settings noted above until we are able to obtain a split night study to reconfirm his diagnosis of sleep apnea and assess what his proper setting will be  At that time he will return for a routine follow-up evaluation  I have asked the patient to contact the [facility] [address] if any difficulties are encountered prior to that time      Counseling / Coordination of Care  Total clinic time spent today 80 minutes in chart review, face-to-face time spent with the patient, coordination of care and documentation  A description of the counseling / coordination of care:     diagnostic results, instructions for management, risk factor reductions, prognosis, patient and family education, impressions, risks and benefits of treatment options and importance of compliance with treatment    The following instructions have been given to the patient today:    Patient Instructions   Get your split night sleep study as soon as possible  The plan would be to order you a new CPAP machine as your current CPAP machine is 8to 15years old  We would be able to set the CPAP at the proper settings after obtaining the split night sleep study  We would then need to follow you back 31-91 days after starting your new machine  Continue to follow with your PCP in regards to your armodafinil and Ambien  You could try to wean yourself off the Ambien by starting with 7 5 mg a night for 2 weeks then trying 5 mg at night for 2 weeks then trying 2 5 mg for 2 weeks and eventually stopping the medication  Please check with your PCP to see if he is okay with you stopping the medicine as well  With great difficulty falling asleep or with difficulty falling back asleep, laying in bed for a prolonged period time is not ideal   This can increase worry and rumination (having racing thoughts, not able to "turn off your brain"  After what feels like 15 minutes, if you feel wide awake, worried, anxious, or can't clear your brain, it is better to leave the bedroom to do something relaxing , The typical recommendation is to read a boring book in dim light  In general, if you leave the room, you want to do something that is relaxing, not stimulating  Avoid bright screens, doing work, doing chores, or anything that requires a lot of mental effort  Return to bed when you feel more calm, sleepy, or mentally clear        Keep a consistent bedtime and wake up time  This will lead to a more regular sleep schedule and avoid periods of sleep deprivation or periods of extended wakefulness during the night  Avoid napping, especially naps lasting longer than 1 hour or naps late in the day, which will likely affect your ability to fall asleep that night  Limit caffeine, avoiding caffeine after lunch to allow it to get out of your system and not affect your ability to fall asleep or the quality of your sleep    Limit alcohol, alcohol can be sedating but also activating as it metabolizes, causing you to awaken from sleep  It can affect the quality of your sleep by not letting you get into the more refreshing stages of sleep  Avoid nicotine, of course not smoking or vaping at all is best, but nicotine is a stimulant and should be avoided near bedtime and during the night    Exercise, Daytime physical activity is encouraged, in particular 4-6 hours before bedtime, as this may help you to fall asleep more easily and quality of sleep is improved  Rigorous exercise within 2 hours of bedtime is discouraged  Keep the sleep environment quiet and dark - Noise and light exposure during the night can disrupt sleep  White noise or ear plugs are often recommended to reduce noise  Using black out shades or an eye mask is commonly recommended to reduce light  This also includes avoiding exposure to television or technology near bedtime, as this can have an impact on circadian rhythms by shifting sleep time later  Bedroom clock - Avoid checking the time at night  This includes alarm clocks and other time pieces such as watches and phones  Checking the time increases cognitive arousal and prolongs wakefulness  Evening eating - Avoid a large meal near bedtime, but don't go to bed hungry  Eat a healthy and filling meal in the evening without over-eating and avoid late night snacks  Nursing Support:  When: Monday through Friday 7A-5PM except holidays  Where:  Our direct line is [phone]  If you are having a true emergency please call 911  In the event that the line is busy or it is after hours please leave a voice message and we will return your call  Please speak clearly, leaving your full name, birth date, best number to reach you and the reason for your call  Medication refills: We will need the name of the medication, the dosage, the ordering provider, whether you get a 30 or 90 day refill, and the pharmacy name and address  Medications will be ordered by the provider only  Nurses cannot call in prescriptions  Please allow 7 days for medication refills  Physician requested updates: If your provider requested that you call with an update after starting medication, please be ready to provide us the medication and dosage, what time you take your medication, the time you attempt to fall asleep, time you fall asleep, when you wake up, and what time you get out of bed  Sleep Study Results: We will contact you with sleep study results and/or next steps after the physician has reviewed your testing               SREEKANTH [address]

## 2022-09-15 NOTE — ASSESSMENT & PLAN NOTE
Patient is currently taking zolpidem 10 mg nightly as he has for the past 12 years  He actually does not feel it is not helpful  I advised him he could discuss with his PCP about weaning off the medication as his PCP is prescribing it currently  I suggested he may try 7 5 mg 2 weeks then 5 mg for 2 weeks and 2 5 mg and eventually stopping the medication  Patient states he tried doxepin in the past but due to side effects of recurring nightmares he was unable to tolerate the medication

## 2022-09-15 NOTE — ASSESSMENT & PLAN NOTE
I advised the patient to work on weight loss as this could contribute to overall better health and also improve his sleep apnea

## 2022-10-06 ENCOUNTER — HOSPITAL ENCOUNTER (OUTPATIENT)
Dept: SLEEP CENTER | Facility: CLINIC | Age: 57
Discharge: HOME | End: 2022-10-06
Payer: COMMERCIAL

## 2022-10-06 DIAGNOSIS — Z99.89 OSA ON CPAP: ICD-10-CM

## 2022-10-06 DIAGNOSIS — G47.61 PLMD (PERIODIC LIMB MOVEMENT DISORDER): ICD-10-CM

## 2022-10-06 DIAGNOSIS — G47.33 OSA ON CPAP: ICD-10-CM

## 2022-10-06 DIAGNOSIS — E66.01 MORBID OBESITY (HCC): ICD-10-CM

## 2022-10-06 PROCEDURE — 95810 POLYSOM 6/> YRS 4/> PARAM: CPT

## 2022-10-06 PROCEDURE — 95810 POLYSOM 6/> YRS 4/> PARAM: CPT | Performed by: INTERNAL MEDICINE

## 2022-10-07 NOTE — PROGRESS NOTES
Sleep Study Documentation    Pre-Sleep Study       Sleep testing procedure explained to patient:YES    Patient napped prior to study:NO    Caffeine:Dayshift worker after 12PM   Caffeine use:NO    Alcohol:Dayshift workers after 5PM: Alcohol use:NO    Typical day for patient:YES       Study Documentation    Sleep Study Indications:     Sleep Study: Diagnostic   Snore:Mild  Supplemental O2: no    O2 flow rate (L/min) range   O2 flow rate (L/min) final   Minimum SaO2 82%  Baseline SaO2 94 6%            EKG abnormalities: no     EEG abnormalities: no    Sleep Study Recorded < 2 hours: N/A    Sleep Study Recorded > 2 hours but incomplete study: N/A    Sleep Study Recorded 6 hours but no sleep obtained: NO    Patient classification: employed       Post-Sleep Study    Medication used at bedtime or during sleep study:YES prescription sleep aid    Patient reports time it took to fall asleep:greater than 60 minutes    Patient reports waking up during study:3 or more times  Patient reports returning to sleep in greater than 30 minutes  Patient reports sleeping less than 2 hours without dreaming  Patient reports sleep during study:typical    Patient rated sleepiness: Very sleepy or tired    PAP treatment:no

## 2022-10-10 ENCOUNTER — TELEPHONE (OUTPATIENT)
Dept: SLEEP CENTER | Facility: CLINIC | Age: 57
End: 2022-10-10

## 2022-10-10 NOTE — TELEPHONE ENCOUNTER
Patient's wife left message stating patient was unable to complete the second part of his split study and she would like to reschedule the test     Spoke to Guilherme King and advised study is resulted and shows severe ARNULFO  The JANY Cuevas still needs to review the results and make her recommendations  Our office will call back once Sarah Cuevas makes her recommendations  Wife verbalized understanding

## 2022-10-11 DIAGNOSIS — G47.33 OSA ON CPAP: Primary | ICD-10-CM

## 2022-10-11 DIAGNOSIS — Z99.89 OSA ON CPAP: Primary | ICD-10-CM

## 2022-10-14 NOTE — TELEPHONE ENCOUNTER
Left message for patient to call office to review sleep study results  Study shows severe ARNULFO (AHI 72 3)  APAP ordered  Needs to schedule DME set up and compliance follow up

## 2022-11-03 ENCOUNTER — TELEPHONE (OUTPATIENT)
Dept: SLEEP CENTER | Facility: CLINIC | Age: 57
End: 2022-11-03

## 2022-11-16 ENCOUNTER — TELEPHONE (OUTPATIENT)
Dept: SLEEP CENTER | Facility: CLINIC | Age: 57
End: 2022-11-16

## 2023-05-18 ENCOUNTER — HOSPITAL ENCOUNTER (OUTPATIENT)
Dept: RADIOLOGY | Age: 58
Discharge: HOME | End: 2023-05-18

## 2023-05-18 ENCOUNTER — OFFICE VISIT (OUTPATIENT)
Dept: ENDOCRINOLOGY | Facility: CLINIC | Age: 58
End: 2023-05-18

## 2023-05-18 VITALS
HEIGHT: 71 IN | BODY MASS INDEX: 44.1 KG/M2 | DIASTOLIC BLOOD PRESSURE: 86 MMHG | HEART RATE: 80 BPM | WEIGHT: 315 LBS | SYSTOLIC BLOOD PRESSURE: 122 MMHG

## 2023-05-18 DIAGNOSIS — E29.1 HYPOGONADISM, MALE: ICD-10-CM

## 2023-05-18 DIAGNOSIS — E03.9 HYPOTHYROIDISM, UNSPECIFIED TYPE: ICD-10-CM

## 2023-05-18 DIAGNOSIS — E66.01 CLASS 3 SEVERE OBESITY WITH SERIOUS COMORBIDITY AND BODY MASS INDEX (BMI) OF 45.0 TO 49.9 IN ADULT, UNSPECIFIED OBESITY TYPE (HCC): ICD-10-CM

## 2023-05-18 DIAGNOSIS — R73.01 IMPAIRED FASTING GLUCOSE: ICD-10-CM

## 2023-05-18 DIAGNOSIS — E03.9 HYPOTHYROIDISM, UNSPECIFIED TYPE: Primary | ICD-10-CM

## 2023-05-18 RX ORDER — TRAZODONE HYDROCHLORIDE 100 MG/1
TABLET ORAL
COMMUNITY
Start: 2022-11-14
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
## 2023-05-26 ENCOUNTER — TELEPHONE (OUTPATIENT)
Dept: ENDOCRINOLOGY | Facility: CLINIC | Age: 58
End: 2023-05-26

## 2023-05-26 NOTE — TELEPHONE ENCOUNTER
Called and spoke with wife Jose Palacios about ultrasound thyroid findings which was within normal limits, no thyroid nodules were seen  Discussed that pyramidal lobe is a normal variant and we do not recommend any further testing at this point    All questions answered and concerns addressed

## 2023-06-05 ENCOUNTER — HOSPITAL ENCOUNTER (OUTPATIENT)
Dept: RADIOLOGY | Age: 58
Discharge: HOME | End: 2023-06-05
Payer: COMMERCIAL

## 2023-06-05 ENCOUNTER — APPOINTMENT (OUTPATIENT)
Dept: LAB | Age: 58
End: 2023-06-05
Payer: COMMERCIAL

## 2023-06-05 DIAGNOSIS — D38.0 NEOPLASM OF UNCERTAIN BEHAVIOR OF LARYNX: ICD-10-CM

## 2023-06-05 PROCEDURE — G1004 CDSM NDSC: HCPCS

## 2023-06-05 PROCEDURE — 70491 CT SOFT TISSUE NECK W/DYE: CPT

## 2023-06-05 RX ADMIN — IOHEXOL 85 ML: 350 INJECTION, SOLUTION INTRAVENOUS at 12:31

## 2023-06-14 ENCOUNTER — HOSPITAL ENCOUNTER (OUTPATIENT)
Facility: HOSPITAL | Age: 58
Setting detail: OUTPATIENT SURGERY
End: 2023-06-14
Attending: OTOLARYNGOLOGY | Admitting: OTOLARYNGOLOGY
Payer: COMMERCIAL

## 2023-06-21 ENCOUNTER — APPOINTMENT (OUTPATIENT)
Dept: LAB | Facility: CLINIC | Age: 58
End: 2023-06-21
Payer: COMMERCIAL

## 2023-06-21 DIAGNOSIS — D38.0: ICD-10-CM

## 2023-06-21 LAB
ANION GAP SERPL CALCULATED.3IONS-SCNC: 6 MMOL/L
BASOPHILS # BLD AUTO: 0.03 THOUSANDS/ÂΜL (ref 0–0.1)
BASOPHILS NFR BLD AUTO: 0 % (ref 0–1)
BUN SERPL-MCNC: 16 MG/DL (ref 5–25)
CALCIUM SERPL-MCNC: 9.3 MG/DL (ref 8.3–10.1)
CHLORIDE SERPL-SCNC: 107 MMOL/L (ref 96–108)
CO2 SERPL-SCNC: 25 MMOL/L (ref 21–32)
CREAT SERPL-MCNC: 1.15 MG/DL (ref 0.6–1.3)
EOSINOPHIL # BLD AUTO: 0.14 THOUSAND/ÂΜL (ref 0–0.61)
EOSINOPHIL NFR BLD AUTO: 2 % (ref 0–6)
ERYTHROCYTE [DISTWIDTH] IN BLOOD BY AUTOMATED COUNT: 14.4 % (ref 11.6–15.1)
GFR SERPL CREATININE-BSD FRML MDRD: 68 ML/MIN/1.73SQ M
GLUCOSE P FAST SERPL-MCNC: 88 MG/DL (ref 65–99)
HCT VFR BLD AUTO: 44.9 % (ref 36.5–49.3)
HGB BLD-MCNC: 14.6 G/DL (ref 12–17)
IMM GRANULOCYTES # BLD AUTO: 0.02 THOUSAND/UL (ref 0–0.2)
IMM GRANULOCYTES NFR BLD AUTO: 0 % (ref 0–2)
LYMPHOCYTES # BLD AUTO: 2.69 THOUSANDS/ÂΜL (ref 0.6–4.47)
LYMPHOCYTES NFR BLD AUTO: 35 % (ref 14–44)
MCH RBC QN AUTO: 30.9 PG (ref 26.8–34.3)
MCHC RBC AUTO-ENTMCNC: 32.5 G/DL (ref 31.4–37.4)
MCV RBC AUTO: 95 FL (ref 82–98)
MONOCYTES # BLD AUTO: 0.62 THOUSAND/ÂΜL (ref 0.17–1.22)
MONOCYTES NFR BLD AUTO: 8 % (ref 4–12)
NEUTROPHILS # BLD AUTO: 4.18 THOUSANDS/ÂΜL (ref 1.85–7.62)
NEUTS SEG NFR BLD AUTO: 55 % (ref 43–75)
NRBC BLD AUTO-RTO: 0 /100 WBCS
PLATELET # BLD AUTO: 324 THOUSANDS/UL (ref 149–390)
PMV BLD AUTO: 10.8 FL (ref 8.9–12.7)
POTASSIUM SERPL-SCNC: 4.4 MMOL/L (ref 3.5–5.3)
RBC # BLD AUTO: 4.72 MILLION/UL (ref 3.88–5.62)
SODIUM SERPL-SCNC: 138 MMOL/L (ref 135–147)
WBC # BLD AUTO: 7.68 THOUSAND/UL (ref 4.31–10.16)

## 2023-06-21 PROCEDURE — 80048 BASIC METABOLIC PNL TOTAL CA: CPT

## 2023-06-21 PROCEDURE — 85025 COMPLETE CBC W/AUTO DIFF WBC: CPT

## 2023-06-21 PROCEDURE — 36415 COLL VENOUS BLD VENIPUNCTURE: CPT

## 2023-10-12 ENCOUNTER — NURSE TRIAGE (OUTPATIENT)
Age: 58
End: 2023-10-12

## 2023-10-12 NOTE — TELEPHONE ENCOUNTER
----- Message from Prema Rodriguez sent at 10/10/2023  8:42 AM EDT -----  Patients spouse calling to cancel and reschedule patients appointment with . Offered next availabiltiy with , 1/2024, patiens spouse declined appointment and states that patient cannot wait until then as he is having issues. Patients spouse states that patient is experiencing issues with his hiatal hernia and gastro issues, would not expand on issues with me. Patients spouse states that patient also has cancer. Offered sooner appointment with PA-C but patient declines seeing PA-C. Is requesting to speak to someone in regards to issues patient is experiencing.

## 2023-10-13 NOTE — TELEPHONE ENCOUNTER
Spoke with patient's wife and states that she was able to get him an apt on 10/24/23 with Dr. Giana Jean. No concerns at this time.

## 2023-10-23 ENCOUNTER — HOSPITAL ENCOUNTER (OUTPATIENT)
Dept: RADIOLOGY | Age: 58
Discharge: HOME | End: 2023-10-23
Payer: COMMERCIAL

## 2023-10-23 ENCOUNTER — HOSPITAL ENCOUNTER (OUTPATIENT)
Dept: RADIOLOGY | Age: 58
Discharge: HOME | End: 2023-10-23

## 2023-10-23 DIAGNOSIS — C76.0 MALIGNANT NEOPLASM OF HEAD, FACE AND NECK (HCC): ICD-10-CM

## 2023-10-23 LAB — GLUCOSE SERPL-MCNC: 95 MG/DL (ref 65–140)

## 2023-10-23 PROCEDURE — A9552 F18 FDG: HCPCS

## 2023-10-23 PROCEDURE — G1004 CDSM NDSC: HCPCS

## 2023-10-23 PROCEDURE — 82948 REAGENT STRIP/BLOOD GLUCOSE: CPT

## 2023-10-23 PROCEDURE — 78815 PET IMAGE W/CT SKULL-THIGH: CPT

## 2023-10-23 RX ORDER — CHLORHEXIDINE GLUCONATE ORAL RINSE 1.2 MG/ML
15 SOLUTION DENTAL 2 TIMES DAILY
COMMUNITY
Start: 2023-08-01

## 2023-10-23 RX ORDER — PSEUDOEPHEDRINE HCL 30 MG
100 TABLET ORAL 2 TIMES DAILY
COMMUNITY
Start: 2023-08-01

## 2023-10-23 RX ORDER — ACETAMINOPHEN 325 MG/1
650 TABLET ORAL
COMMUNITY
Start: 2023-08-01

## 2023-10-23 RX ORDER — OXYCODONE HYDROCHLORIDE 5 MG/1
5 TABLET ORAL
COMMUNITY
Start: 2023-08-01

## 2023-10-24 ENCOUNTER — OFFICE VISIT (OUTPATIENT)
Dept: GASTROENTEROLOGY | Facility: CLINIC | Age: 58
End: 2023-10-24
Payer: COMMERCIAL

## 2023-10-24 VITALS
WEIGHT: 308 LBS | OXYGEN SATURATION: 96 % | SYSTOLIC BLOOD PRESSURE: 112 MMHG | HEIGHT: 71 IN | HEART RATE: 87 BPM | DIASTOLIC BLOOD PRESSURE: 76 MMHG | BODY MASS INDEX: 43.12 KG/M2

## 2023-10-24 DIAGNOSIS — R13.19 ESOPHAGEAL DYSPHAGIA: Primary | ICD-10-CM

## 2023-10-24 DIAGNOSIS — K58.0 IRRITABLE BOWEL SYNDROME WITH DIARRHEA: ICD-10-CM

## 2023-10-24 DIAGNOSIS — K59.1 FUNCTIONAL DIARRHEA: ICD-10-CM

## 2023-10-24 PROCEDURE — 99214 OFFICE O/P EST MOD 30 MIN: CPT | Performed by: INTERNAL MEDICINE

## 2023-10-24 RX ORDER — PANTOPRAZOLE SODIUM 40 MG/1
40 TABLET, DELAYED RELEASE ORAL DAILY
Qty: 90 TABLET | Refills: 4 | Status: SHIPPED | OUTPATIENT
Start: 2023-10-24

## 2023-10-24 NOTE — PROGRESS NOTES
Follow-up Note -  [facility]  Gio Hylton 1965 59 y.o. male     ASSESSMENT @ PLAN:   He is a 40-year-old male with obesity related gastroesophageal reflux disease well managed with pantoprazole with increasing episodes of periumbilical abdominal discomfort and diarrhea consistent with the Brent criteria for IBS D. He had endoscopy colonoscopy with me in November 2021 with LA grade a reflux esophagitis small 1 cm hiatal hernia mild nonerosive gastritis and a colonoscopy with normal colon and terminal ileum. He improved on a probiotic and pantoprazole    1 we will give pantoprazole. 2 rifaximin course for treatment of IBS-D. He will continue on his probiotic.    3 dietary management for gut health and altered microflora were reviewed with the patient. Reason: GERD and IBS-D    HPI: He is a 40-year-old male with GERD and IBS-D who is feeling bloated with cramping and urgency with frequency. He has no melena hematochezia his weight is stable. He had stage I throat cancer and had a surgical with lymph surgery with lymph node dissection and is recovered from this and is doing well. He is off of his immunosuppressive regimen for his RA. He is worried about his gut health and we talked about this. He had endoscopy colonoscopy with me in November 2021. The endoscopy showed LA grade a reflux esophagitis small 1 cm hiatal hernia mild nonerosive gastritis. The colonoscopy showed normal colon and terminal ileum. REVIEW OF SYSTEMS:     CONSTITUTIONAL: Denies any fever, chills, or rigors. Good appetite, and no recent weight loss. HEENT: No earache or tinnitus. Denies hearing loss or visual disturbances. CARDIOVASCULAR: No chest pain or palpitations. RESPIRATORY: Denies any cough, hemoptysis, shortness of breath or dyspnea on exertion. GASTROINTESTINAL: As noted in the History of Present Illness. GENITOURINARY: No problems with urination.  Denies any hematuria or dysuria. NEUROLOGIC: No dizziness or vertigo, denies headaches. MUSCULOSKELETAL: Denies any muscle or joint pain. SKIN: Denies skin rashes or itching. ENDOCRINE: Denies excessive thirst. Denies intolerance to heat or cold. PSYCHOSOCIAL: Denies depression or anxiety. Denies any recent memory loss. Past Medical History:   Diagnosis Date    Asthma     Colon polyp     CPAP (continuous positive airway pressure) dependence     Disease of thyroid gland     GERD (gastroesophageal reflux disease)     History of transfusion     age 3    Obese     Sleep apnea     Sleep difficulties       Past Surgical History:   Procedure Laterality Date    CHOLECYSTECTOMY      COLONOSCOPY      HERNIA REPAIR Bilateral     [place] COLONOSCOPY FLX DX W/COLLJ SPEC WHEN PFRMD N/A 01/04/2018    Procedure: COLONOSCOPY;  Surgeon: Avis Koehler MD;  Location: [facility]; Service: Gastroenterology    SINUS SURGERY      THYROID SURGERY      TONSILLECTOMY       Social History     Socioeconomic History    Marital status: /Civil Union     Spouse name: Not on file    Number of children: Not on file    Years of education: Not on file    Highest education level: Not on file   Occupational History    Not on file   Tobacco Use    Smoking status: Never    Smokeless tobacco: Never   Vaping Use    Vaping Use: Never used   Substance and Sexual Activity    Alcohol use:  Yes     Alcohol/week: 7.0 standard drinks of alcohol     Types: 7 Standard drinks or equivalent per week    Drug use: No    Sexual activity: Not on file   Other Topics Concern    Not on file   Social History Narrative    Not on file     Social Determinants of Health     Financial Resource Strain: Not on file   Food Insecurity: Not on file   Transportation Needs: Not on file   Physical Activity: Not on file   Stress: Not on file   Social Connections: Not on file   Intimate Partner Violence: Not on file   Housing Stability: Not on file     Family History   Problem Relation Age of Onset Cancer Mother     Colon polyps Father     Cancer Sister      Dextromethorphan, Morphine and related, and Other  Current Outpatient Medications   Medication Sig Dispense Refill    acetaminophen (TYLENOL) 325 mg tablet Take 650 mg by mouth      albuterol (PROVENTIL HFA,VENTOLIN HFA) 90 mcg/act inhaler Inhale 2 puffs every 6 (six) hours as needed for wheezing      Armodafinil 250 MG tablet Take 250 mg by mouth daily Patient taking 1/2 of 250mg      chlorhexidine (PERIDEX) 0.12 % solution 15 mL by Transmucosal route 2 (two) times a day      Clobetasol Propionate E 0.05 % emollient cream       Docusate Sodium (DSS) 100 MG CAPS Take 100 mg by mouth 2 (two) times a day      DULoxetine (CYMBALTA) 60 mg delayed release capsule       fluticasone (FLONASE) 50 mcg/act nasal spray 1 spray into each nostril as needed for rhinitis      fluticasone-salmeterol (ADVAIR) 250-50 mcg/dose inhaler Inhale 1 puff daily in the early morning      furosemide (LASIX) 20 mg tablet Take 20 mg by mouth as needed      gabapentin (NEURONTIN) 100 mg capsule       ibuprofen (MOTRIN) 600 mg tablet Take 600 mg by mouth as needed      Levothyroxine Sodium 88 MCG CAPS Take by mouth      montelukast (SINGULAIR) 10 mg tablet TAKE 1 TABLET BY MOUTH ONCE DAILY (Patient taking differently: 8 tablets weekly) 90 tablet 3    multivitamin (THERAGRAN) TABS Take 1 tablet by mouth daily      oxyCODONE (ROXICODONE) 5 immediate release tablet Take 5 mg by mouth      rifaximin (XIFAXAN) 550 mg tablet Take 1 tablet (550 mg total) by mouth every 8 (eight) hours for 14 days 42 tablet 0    rosuvastatin (CRESTOR) 20 MG tablet       traZODone (DESYREL) 100 mg tablet       zolpidem (AMBIEN) 10 mg tablet Take 10 mg by mouth daily at bedtime as needed for sleep       No current facility-administered medications for this visit. Blood pressure 112/76, pulse 87, height 5' 11" (1.803 m), weight (!) 140 kg (308 lb), SpO2 96 %.     PHYSICAL EXAM:     General Appearance:   Alert, cooperative, no distress, appears stated age    HEENT:   Normocephalic, atraumatic, anicteric. Neck:  Supple, symmetrical, trachea midline, no adenopathy;    thyroid: no enlargement/tenderness/nodules; no carotid  bruit or JVD    Lungs:   Clear to auscultation bilaterally; no rales, rhonchi or wheezing; respirations unlabored    Heart[de-identified]   S1 and S2 normal; regular rate and rhythm; no murmur, rub, or gallop.    Abdomen:   Soft, non-tender, non-distended; normal bowel sounds; no masses, no organomegaly    Genitalia:   Deferred    Rectal:   Deferred    Extremities:  No cyanosis, clubbing or edema    Pulses:  2+ and symmetric all extremities    Skin:  Skin color, texture, turgor normal, no rashes or lesions    Lymph nodes:  No palpable cervical, axillary or inguinal lymphadenopathy        Lab Results   Component Value Date    WBC 7.68 06/21/2023    HGB 14.6 06/21/2023    HCT 44.9 06/21/2023    MCV 95 06/21/2023     06/21/2023     Lab Results   Component Value Date    GLUCOSE 146 (H) 04/16/2015    CALCIUM 9.3 06/21/2023     04/16/2015    K 4.4 06/21/2023    CO2 25 06/21/2023     06/21/2023    BUN 16 06/21/2023    CREATININE 1.15 06/21/2023     Lab Results   Component Value Date    ALT 34 03/31/2015    AST 16 03/31/2015    ALKPHOS 58 03/31/2015    BILITOT 0.3 03/31/2015     Lab Results   Component Value Date    INR 0.95 03/31/2015    PROTIME 12.9 03/31/2015

## 2023-11-01 DIAGNOSIS — R13.19 ESOPHAGEAL DYSPHAGIA: ICD-10-CM

## 2023-11-01 RX ORDER — PANTOPRAZOLE SODIUM 40 MG/1
40 TABLET, DELAYED RELEASE ORAL DAILY
Qty: 90 TABLET | Refills: 4 | OUTPATIENT
Start: 2023-11-01

## 2023-11-02 NOTE — TELEPHONE ENCOUNTER
Patients wife called and stated prescription is not at the pharmacy and patient is out of medication please review and reach out to let them know thank you

## 2023-11-07 ENCOUNTER — TELEPHONE (OUTPATIENT)
Dept: GASTROENTEROLOGY | Facility: CLINIC | Age: 58
End: 2023-11-07

## 2023-11-07 DIAGNOSIS — R13.19 ESOPHAGEAL DYSPHAGIA: ICD-10-CM

## 2023-11-07 RX ORDER — PANTOPRAZOLE SODIUM 40 MG/1
40 TABLET, DELAYED RELEASE ORAL DAILY
Qty: 90 TABLET | Refills: 4 | Status: SHIPPED | OUTPATIENT
Start: 2023-11-07 | End: 2023-11-07 | Stop reason: SDUPTHER

## 2023-11-07 RX ORDER — PANTOPRAZOLE SODIUM 40 MG/1
40 TABLET, DELAYED RELEASE ORAL DAILY
Qty: 90 TABLET | Refills: 4 | Status: SHIPPED | OUTPATIENT
Start: 2023-11-07

## 2023-11-07 NOTE — TELEPHONE ENCOUNTER
Spoke to pt's wife Morales Thornton informed her that they need to accept the text from Let[employer] Sung in order to get the process started for the Xifaxan support. Wife verbalized understanding. Sent a note to Pod to initiate Prior auth for Xifaxan.

## 2023-11-07 NOTE — TELEPHONE ENCOUNTER
----- Message from [facility] sent at 11/7/2023 11:37 AM EST -----  Regarding: FW: Protonix  (pantoprazxple 40 mg refill)  Contact: [phone]  Can we f/u on the Xifaxan. Sent to [facility] on 10/23.    ----- Message -----  From: Sammie Samaniego  Sent: 11/7/2023  10:57 AM EST  To: [facility] Provider  Subject: FW: Protonix  (pantoprazxple 40 mg refill)         ----- Message -----  From: Torsten Ferrera"  Sent: 11/6/2023   6:21 AM EST  To: [facility] Clinical  Subject: Protonix  (pantoprazxple 40 mg refill)           I received a message that my request for a refill of this medication I have been taking has been denied. I saw Dr Jose Alberto Sebastian briefly (very brief visit) on 10/24/2023. He wanted me to start taking a new medication rifaxmin. I was not able to get this medication. That is why I am asking for a refill of protonix sent to [facility]. My wife Carroll Mckeon called 2 times last week and explained this. She was told I would be getting a call back from nurse. That call has not happened. It is very frustrating when you can not speak to someone directly at the office.      Thank you  Gilma Sanches

## 2023-11-07 NOTE — TELEPHONE ENCOUNTER
Pts wife calling in, reports she has not received any medications that were sent in after OV. Please sign qued up medication to send [facility]. Clinical: are there any rifaximin samples in the office for patient to  or can we call a rep? Insurance will not pay for medication and wife said they called [facility] but they were not help.  Please call her back at [phone]

## 2023-11-07 NOTE — TELEPHONE ENCOUNTER
PA REQUEST FOR COVERAGE DETERMINATION HAS BEEN SUBMITTED TO PLAN    CMM KEY  [id number]     WILL AWAIT PLAN RESPONSE

## 2023-11-07 NOTE — TELEPHONE ENCOUNTER
Michael Thornton (Key: [id number])  Xifaxan 550MG tablets     Form  [employer] Electronic Prior Authorization Form (2017 [employer])  Created  2 hours ago  Sent to Plan  2 hours ago  Plan Response  2 hours ago  Submit Clinical Questions  2 hours ago  Determination  Unfavorable  40 minutes ago  Message from [address] [place] [address] Reference Number: [id number]. XIFAXAN TAB 550MG is denied for not meeting the prior authorization requirement(s). Details of this decision are in the notice attached below or have been faxed to you.   AWAITING FAXED DETAILS

## 2024-07-05 ENCOUNTER — NURSE TRIAGE (OUTPATIENT)
Age: 59
End: 2024-07-05

## 2024-07-05 NOTE — TELEPHONE ENCOUNTER
"Please advise   Last OV: 10/24/23   Hx: GERD, IBS-D     Spoke with pts wife, no communication consent on file. I explained I could not provide her with any of pts medical information without speak with pt first. She explained pt is at work but she is asking for an OV sooner than December.     She explained pt having rectal bleeding, change in bowel habits for about a month now- very large/ painful BM and possibly hemorrhoids.     Pt has history of throat cancer and she is asking pt to be seen sooner only by Dr. Anaya. I do not see any OV until December.     Please advise if urgent should be made? I could not speak to pt and she declined pt calling up back to discuss symptoms in detail.     Reason for Disposition   Information only question and nurse able to answer    Answer Assessment - Initial Assessment Questions  1. REASON FOR CALL or QUESTION: \"What is your reason for calling today?\" or \"How can I best help you?\" or \"What question do you have that I can help answer?\"      See notes    Protocols used: Information Only Call - No Triage-ADULT-[place]    "

## 2024-07-05 NOTE — TELEPHONE ENCOUNTER
Regarding: symptoms  ----- Message from Esha MCKEON sent at 7/5/2024  8:49 AM EDT -----  Patient's wife called in requesting the first available appt with Dr. Anaya. Offered 7/5/2024 at 9:20am and patient wife stated pt is at work.   Mari is requesting to speak with a nurse regarding issues with pt's colon and is requesting to see only Dr. Anaya.

## 2024-07-18 RX ORDER — ZOLPIDEM TARTRATE 12.5 MG/1
TABLET, FILM COATED, EXTENDED RELEASE ORAL
COMMUNITY

## 2024-07-18 RX ORDER — ADALIMUMAB 40MG/0.8ML
40 KIT SUBCUTANEOUS
COMMUNITY

## 2024-07-18 RX ORDER — APIXABAN 5 MG/1
TABLET, FILM COATED ORAL
COMMUNITY
Start: 2024-06-22 | End: 2024-07-23

## 2024-07-18 RX ORDER — SILDENAFIL CITRATE 20 MG/1
TABLET ORAL
COMMUNITY

## 2024-07-18 RX ORDER — VIT C/B6/B5/MAGNESIUM/HERB 173 50-5-6-5MG
CAPSULE ORAL
COMMUNITY

## 2024-07-18 RX ORDER — METFORMIN HYDROCHLORIDE 500 MG/1
TABLET, EXTENDED RELEASE ORAL
COMMUNITY
Start: 2024-06-12

## 2024-07-18 RX ORDER — ADALIMUMAB 40MG/0.4ML
KIT SUBCUTANEOUS
COMMUNITY
End: 2024-07-23

## 2024-07-18 RX ORDER — DICLOFENAC SODIUM 75 MG/1
TABLET, DELAYED RELEASE ORAL
COMMUNITY
Start: 2024-05-23

## 2024-07-23 ENCOUNTER — OFFICE VISIT (OUTPATIENT)
Dept: GASTROENTEROLOGY | Facility: CLINIC | Age: 59
End: 2024-07-23
Payer: COMMERCIAL

## 2024-07-23 VITALS
SYSTOLIC BLOOD PRESSURE: 124 MMHG | BODY MASS INDEX: 44.1 KG/M2 | WEIGHT: 315 LBS | OXYGEN SATURATION: 98 % | HEIGHT: 71 IN | DIASTOLIC BLOOD PRESSURE: 81 MMHG | HEART RATE: 71 BPM | TEMPERATURE: 97.8 F

## 2024-07-23 DIAGNOSIS — R19.4 CHANGE IN BOWEL HABITS: Primary | ICD-10-CM

## 2024-07-23 DIAGNOSIS — R13.19 ESOPHAGEAL DYSPHAGIA: ICD-10-CM

## 2024-07-23 DIAGNOSIS — K58.0 IRRITABLE BOWEL SYNDROME WITH DIARRHEA: ICD-10-CM

## 2024-07-23 PROCEDURE — 99214 OFFICE O/P EST MOD 30 MIN: CPT | Performed by: INTERNAL MEDICINE

## 2024-07-23 RX ORDER — LEVOTHYROXINE SODIUM 88 UG/1
TABLET ORAL
COMMUNITY
Start: 2024-07-17

## 2024-07-23 NOTE — PATIENT INSTRUCTIONS
Scheduled date of colonoscopy (as of today):8/17/24  Physician performing colonoscopy:Héctor  Location of colonoscopy:[facility]  Bowel prep reviewed with patient:miralax/dulcolax  Instructions reviewed with patient by:Jessica HELLER  Clearances:  none

## 2024-07-23 NOTE — PROGRESS NOTES
Follow-up Note -  [facility]  João Hylton 1965 59 y.o. male     ASSESSMENT @ PLAN:   He is a 59-year-old male with GERD and IBS with significant change in bowel habits which he bribes as a perianal growth coming in for evaluation.  He was diagnosed with laryngeal cancer last year and is status post surgery at [facility] and doing well and is worried about his symptoms.  Endoscopy colonoscopy with me in November 2021.  SB showed LA grade a reflux esophagitis 1 cm hiatal hernia mild nonerosive gastritis colonoscopy was normal with normal terminal ileum and he has a history of colon polyps    1 we will do a colonoscopy to investigate    2 he will continue on pantoprazole 40 mg daily    Reason: Change in bowel habits and GERD    HPI: He is a 59-year-old male with change in bowel habits and GERD.  He reports significant change in bowel habits with 2 weeks of hardness of straining and incomplete evacuation.  He was diagnosed with laryngeal cancer after becoming hoarse approximately a year ago he had a  surgery at [facility] with ENT and is now cancer free.  He did not have chemotherapy or radiation.  He wants to have another colonoscopy.  He had a colonoscopy with me in November 2021 that was normal with normal terminal ileum.  He had endoscopy at that time that showed LA grade a reflux esophagitis 1 cm hiatal hernia mild nonerosive gastritis.  He has had no melena or hematochezia.  He has obesity with a BMI of 44.9.  His reflux is quiescent on pantoprazole.  He has no heartburn regurgitation no solid or liquid food dysphagia no close no nausea no vomiting    REVIEW OF SYSTEMS:     CONSTITUTIONAL: Denies any fever, chills, or rigors. Good appetite, and no recent weight loss.  HEENT: No earache or tinnitus. Denies hearing loss or visual disturbances.  CARDIOVASCULAR: No chest pain or palpitations.   RESPIRATORY: Denies any cough, hemoptysis, shortness of breath or  dyspnea on exertion.  GASTROINTESTINAL: As noted in the History of Present Illness.   GENITOURINARY: No problems with urination. Denies any hematuria or dysuria.  NEUROLOGIC: No dizziness or vertigo, denies headaches.   MUSCULOSKELETAL: Denies any muscle or joint pain.   SKIN: Denies skin rashes or itching.   ENDOCRINE: Denies excessive thirst. Denies intolerance to heat or cold.  PSYCHOSOCIAL: Denies depression or anxiety. Denies any recent memory loss.     Past Medical History:   Diagnosis Date    Asthma     Colon polyp     CPAP (continuous positive airway pressure) dependence     Disease of thyroid gland     GERD (gastroesophageal reflux disease)     History of transfusion     age 7    Obese     Sleep apnea     Sleep difficulties       Past Surgical History:   Procedure Laterality Date    CHOLECYSTECTOMY      COLONOSCOPY      HERNIA REPAIR Bilateral     DE COLONOSCOPY FLX DX W/COLLJ SPEC WHEN PFRMD N/A 01/04/2018    Procedure: COLONOSCOPY;  Surgeon: Clement Downs MD;  Location[facility];  Service: Gastroenterology    SINUS SURGERY      THYROID SURGERY      TONSILLECTOMY       Social History     Socioeconomic History    Marital status: /Civil Union     Spouse name: Not on file    Number of children: Not on file    Years of education: Not on file    Highest education level: Not on file   Occupational History    Not on file   Tobacco Use    Smoking status: Never    Smokeless tobacco: Never   Vaping Use    Vaping status: Never Used   Substance and Sexual Activity    Alcohol use: Yes     Alcohol/week: 7.0 standard drinks of alcohol     Types: 7 Standard drinks or equivalent per week    Drug use: No    Sexual activity: Not on file   Other Topics Concern    Not on file   Social History Narrative    Not on file     Social Determinants of Health     Financial Resource Strain: Not on file   Food Insecurity: Not on file   Transportation Needs: Not on file   Physical Activity: Not on file   Stress: Not on file  "  Social Connections: Not on file   Intimate Partner Violence: Not on file   Housing Stability: Not on file     Family History   Problem Relation Age of Onset    Cancer Mother     Colon polyps Father     Cancer Sister      Dextromethorphan, Morphine and codeine, Other, and Oxycodone  Current Outpatient Medications   Medication Sig Dispense Refill    adalimumab (Humira, 2 Syringe,) 40 mg/0.8 mL PSKT Inject 40 mg under the skin every 14 (fourteen) days      albuterol (PROVENTIL HFA,VENTOLIN HFA) 90 mcg/act inhaler Inhale 2 puffs every 6 (six) hours as needed for wheezing      Armodafinil 250 MG tablet Take 250 mg by mouth daily Patient taking 1/2 of 250mg      B Complex Vitamins (B COMPLEX 1 PO)       diclofenac (VOLTAREN) 75 mg EC tablet 60 tab(s), 0 Refill(s)      DULoxetine (CYMBALTA) 60 mg delayed release capsule       fluticasone (FLONASE) 50 mcg/act nasal spray 1 spray into each nostril as needed for rhinitis      fluticasone-salmeterol (ADVAIR) 250-50 mcg/dose inhaler Inhale 1 puff daily in the early morning      furosemide (LASIX) 20 mg tablet Take 20 mg by mouth as needed      gabapentin (NEURONTIN) 100 mg capsule       ibuprofen (MOTRIN) 600 mg tablet Take 600 mg by mouth as needed      levothyroxine 88 mcg tablet       metFORMIN (GLUCOPHAGE-XR) 500 mg 24 hr tablet       montelukast (SINGULAIR) 10 mg tablet TAKE 1 TABLET BY MOUTH ONCE DAILY (Patient taking differently: 8 tablets weekly) 90 tablet 3    multivitamin (THERAGRAN) TABS Take 1 tablet by mouth daily      pantoprazole (PROTONIX) 40 mg tablet Take 1 tablet (40 mg total) by mouth daily 90 tablet 4    rosuvastatin (CRESTOR) 20 MG tablet       sildenafil (REVATIO) 20 mg tablet       Turmeric (QC Tumeric Complex) 500 MG CAPS       zolpidem (AMBIEN CR) 12.5 MG CR tablet        No current facility-administered medications for this visit.       Blood pressure 124/81, pulse 71, temperature 97.8 °F (36.6 °C), temperature source Temporal, height 5' 11\" (1.803 " m), weight (!) 146 kg (322 lb 3.2 oz), SpO2 98%.    PHYSICAL EXAM:     General Appearance:   Alert, cooperative, no distress, appears stated age    HEENT:   Normocephalic, atraumatic, anicteric.     Neck:  Supple, symmetrical, trachea midline, no adenopathy;    thyroid: no enlargement/tenderness/nodules; no carotid  bruit or JVD    Lungs:   Clear to auscultation bilaterally; no rales, rhonchi or wheezing; respirations unlabored    Heart::   S1 and S2 normal; regular rate and rhythm; no murmur, rub, or gallop.   Abdomen:   Soft, non-tender, non-distended; normal bowel sounds; no masses, no organomegaly    Genitalia:   Deferred    Rectal:   Deferred    Extremities:  No cyanosis, clubbing or edema    Pulses:  2+ and symmetric all extremities    Skin:  Skin color, texture, turgor normal, no rashes or lesions    Lymph nodes:  No palpable cervical, axillary or inguinal lymphadenopathy        Lab Results   Component Value Date    WBC 7.68 06/21/2023    HGB 14.6 06/21/2023    HCT 44.9 06/21/2023    MCV 95 06/21/2023     06/21/2023     Lab Results   Component Value Date    GLUCOSE 146 (H) 04/16/2015    CALCIUM 9.8 04/16/2024     04/16/2015    K 4.2 04/16/2024    CO2 29 04/16/2024     04/16/2024    BUN 15 04/16/2024    CREATININE 1.00 04/16/2024     Lab Results   Component Value Date    ALT 33 04/16/2024    AST 21 04/16/2024    ALKPHOS 48 04/16/2024    BILITOT 0.3 03/31/2015     Lab Results   Component Value Date    INR 0.95 03/31/2015    PROTIME 12.9 03/31/2015

## 2024-07-23 NOTE — H&P (VIEW-ONLY)
Follow-up Note -  [facility]  João Hylton 1965 59 y.o. male     ASSESSMENT @ PLAN:   He is a 59-year-old male with GERD and IBS with significant change in bowel habits which he bribes as a perianal growth coming in for evaluation.  He was diagnosed with laryngeal cancer last year and is status post surgery at [facility] and doing well and is worried about his symptoms.  Endoscopy colonoscopy with me in November 2021.  SB showed LA grade a reflux esophagitis 1 cm hiatal hernia mild nonerosive gastritis colonoscopy was normal with normal terminal ileum and he has a history of colon polyps    1 we will do a colonoscopy to investigate    2 he will continue on pantoprazole 40 mg daily    Reason: Change in bowel habits and GERD    HPI: He is a 59-year-old male with change in bowel habits and GERD.  He reports significant change in bowel habits with 2 weeks of hardness of straining and incomplete evacuation.  He was diagnosed with laryngeal cancer after becoming hoarse approximately a year ago he had a  surgery at [facility] with ENT and is now cancer free.  He did not have chemotherapy or radiation.  He wants to have another colonoscopy.  He had a colonoscopy with me in November 2021 that was normal with normal terminal ileum.  He had endoscopy at that time that showed LA grade a reflux esophagitis 1 cm hiatal hernia mild nonerosive gastritis.  He has had no melena or hematochezia.  He has obesity with a BMI of 44.9.  His reflux is quiescent on pantoprazole.  He has no heartburn regurgitation no solid or liquid food dysphagia no close no nausea no vomiting    REVIEW OF SYSTEMS:     CONSTITUTIONAL: Denies any fever, chills, or rigors. Good appetite, and no recent weight loss.  HEENT: No earache or tinnitus. Denies hearing loss or visual disturbances.  CARDIOVASCULAR: No chest pain or palpitations.   RESPIRATORY: Denies any cough, hemoptysis, shortness of breath or  dyspnea on exertion.  GASTROINTESTINAL: As noted in the History of Present Illness.   GENITOURINARY: No problems with urination. Denies any hematuria or dysuria.  NEUROLOGIC: No dizziness or vertigo, denies headaches.   MUSCULOSKELETAL: Denies any muscle or joint pain.   SKIN: Denies skin rashes or itching.   ENDOCRINE: Denies excessive thirst. Denies intolerance to heat or cold.  PSYCHOSOCIAL: Denies depression or anxiety. Denies any recent memory loss.     Past Medical History:   Diagnosis Date    Asthma     Colon polyp     CPAP (continuous positive airway pressure) dependence     Disease of thyroid gland     GERD (gastroesophageal reflux disease)     History of transfusion     age 7    Obese     Sleep apnea     Sleep difficulties       Past Surgical History:   Procedure Laterality Date    CHOLECYSTECTOMY      COLONOSCOPY      HERNIA REPAIR Bilateral     OR COLONOSCOPY FLX DX W/COLLJ SPEC WHEN PFRMD N/A 01/04/2018    Procedure: COLONOSCOPY;  Surgeon: Clement Downs MD;  [facility];  Service: Gastroenterology    SINUS SURGERY      THYROID SURGERY      TONSILLECTOMY       Social History     Socioeconomic History    Marital status: /Civil Union     Spouse name: Not on file    Number of children: Not on file    Years of education: Not on file    Highest education level: Not on file   Occupational History    Not on file   Tobacco Use    Smoking status: Never    Smokeless tobacco: Never   Vaping Use    Vaping status: Never Used   Substance and Sexual Activity    Alcohol use: Yes     Alcohol/week: 7.0 standard drinks of alcohol     Types: 7 Standard drinks or equivalent per week    Drug use: No    Sexual activity: Not on file   Other Topics Concern    Not on file   Social History Narrative    Not on file     Social Determinants of Health     Financial Resource Strain: Not on file   Food Insecurity: Not on file   Transportation Needs: Not on file   Physical Activity: Not on file   Stress: Not on file  "  Social Connections: Not on file   Intimate Partner Violence: Not on file   Housing Stability: Not on file     Family History   Problem Relation Age of Onset    Cancer Mother     Colon polyps Father     Cancer Sister      Dextromethorphan, Morphine and codeine, Other, and Oxycodone  Current Outpatient Medications   Medication Sig Dispense Refill    adalimumab (Humira, 2 Syringe,) 40 mg/0.8 mL PSKT Inject 40 mg under the skin every 14 (fourteen) days      albuterol (PROVENTIL HFA,VENTOLIN HFA) 90 mcg/act inhaler Inhale 2 puffs every 6 (six) hours as needed for wheezing      Armodafinil 250 MG tablet Take 250 mg by mouth daily Patient taking 1/2 of 250mg      B Complex Vitamins (B COMPLEX 1 PO)       diclofenac (VOLTAREN) 75 mg EC tablet 60 tab(s), 0 Refill(s)      DULoxetine (CYMBALTA) 60 mg delayed release capsule       fluticasone (FLONASE) 50 mcg/act nasal spray 1 spray into each nostril as needed for rhinitis      fluticasone-salmeterol (ADVAIR) 250-50 mcg/dose inhaler Inhale 1 puff daily in the early morning      furosemide (LASIX) 20 mg tablet Take 20 mg by mouth as needed      gabapentin (NEURONTIN) 100 mg capsule       ibuprofen (MOTRIN) 600 mg tablet Take 600 mg by mouth as needed      levothyroxine 88 mcg tablet       metFORMIN (GLUCOPHAGE-XR) 500 mg 24 hr tablet       montelukast (SINGULAIR) 10 mg tablet TAKE 1 TABLET BY MOUTH ONCE DAILY (Patient taking differently: 8 tablets weekly) 90 tablet 3    multivitamin (THERAGRAN) TABS Take 1 tablet by mouth daily      pantoprazole (PROTONIX) 40 mg tablet Take 1 tablet (40 mg total) by mouth daily 90 tablet 4    rosuvastatin (CRESTOR) 20 MG tablet       sildenafil (REVATIO) 20 mg tablet       Turmeric (QC Tumeric Complex) 500 MG CAPS       zolpidem (AMBIEN CR) 12.5 MG CR tablet        No current facility-administered medications for this visit.       Blood pressure 124/81, pulse 71, temperature 97.8 °F (36.6 °C), temperature source Temporal, height 5' 11\" (1.803 " m), weight (!) 146 kg (322 lb 3.2 oz), SpO2 98%.    PHYSICAL EXAM:     General Appearance:   Alert, cooperative, no distress, appears stated age    HEENT:   Normocephalic, atraumatic, anicteric.     Neck:  Supple, symmetrical, trachea midline, no adenopathy;    thyroid: no enlargement/tenderness/nodules; no carotid  bruit or JVD    Lungs:   Clear to auscultation bilaterally; no rales, rhonchi or wheezing; respirations unlabored    Heart::   S1 and S2 normal; regular rate and rhythm; no murmur, rub, or gallop.   Abdomen:   Soft, non-tender, non-distended; normal bowel sounds; no masses, no organomegaly    Genitalia:   Deferred    Rectal:   Deferred    Extremities:  No cyanosis, clubbing or edema    Pulses:  2+ and symmetric all extremities    Skin:  Skin color, texture, turgor normal, no rashes or lesions    Lymph nodes:  No palpable cervical, axillary or inguinal lymphadenopathy        Lab Results   Component Value Date    WBC 7.68 06/21/2023    HGB 14.6 06/21/2023    HCT 44.9 06/21/2023    MCV 95 06/21/2023     06/21/2023     Lab Results   Component Value Date    GLUCOSE 146 (H) 04/16/2015    CALCIUM 9.8 04/16/2024     04/16/2015    K 4.2 04/16/2024    CO2 29 04/16/2024     04/16/2024    BUN 15 04/16/2024    CREATININE 1.00 04/16/2024     Lab Results   Component Value Date    ALT 33 04/16/2024    AST 21 04/16/2024    ALKPHOS 48 04/16/2024    BILITOT 0.3 03/31/2015     Lab Results   Component Value Date    INR 0.95 03/31/2015    PROTIME 12.9 03/31/2015

## 2024-08-17 ENCOUNTER — ANESTHESIA (OUTPATIENT)
Dept: GASTROENTEROLOGY | Facility: HOSPITAL | Age: 59
End: 2024-08-17

## 2024-08-17 ENCOUNTER — ANESTHESIA EVENT (OUTPATIENT)
Dept: GASTROENTEROLOGY | Facility: HOSPITAL | Age: 59
End: 2024-08-17

## 2024-08-17 ENCOUNTER — HOSPITAL ENCOUNTER (OUTPATIENT)
Dept: GASTROENTEROLOGY | Facility: HOSPITAL | Age: 59
Setting detail: OUTPATIENT SURGERY
Discharge: HOME | End: 2024-08-17
Attending: INTERNAL MEDICINE
Payer: COMMERCIAL

## 2024-08-17 VITALS
WEIGHT: 315 LBS | RESPIRATION RATE: 21 BRPM | HEART RATE: 71 BPM | SYSTOLIC BLOOD PRESSURE: 152 MMHG | TEMPERATURE: 97.6 F | DIASTOLIC BLOOD PRESSURE: 86 MMHG | BODY MASS INDEX: 44.1 KG/M2 | OXYGEN SATURATION: 99 % | HEIGHT: 71 IN

## 2024-08-17 DIAGNOSIS — R19.4 CHANGE IN BOWEL HABITS: ICD-10-CM

## 2024-08-17 PROCEDURE — 88305 TISSUE EXAM BY PATHOLOGIST: CPT | Performed by: STUDENT IN AN ORGANIZED HEALTH CARE EDUCATION/TRAINING PROGRAM

## 2024-08-17 PROCEDURE — 45380 COLONOSCOPY AND BIOPSY: CPT | Performed by: INTERNAL MEDICINE

## 2024-08-17 RX ORDER — PROPOFOL 10 MG/ML
INJECTION, EMULSION INTRAVENOUS AS NEEDED
Status: DISCONTINUED | OUTPATIENT
Start: 2024-08-17 | End: 2024-08-17

## 2024-08-17 RX ORDER — LIDOCAINE HYDROCHLORIDE 20 MG/ML
INJECTION, SOLUTION EPIDURAL; INFILTRATION; INTRACAUDAL; PERINEURAL AS NEEDED
Status: DISCONTINUED | OUTPATIENT
Start: 2024-08-17 | End: 2024-08-17

## 2024-08-17 RX ADMIN — PROPOFOL 20 MG: 10 INJECTION, EMULSION INTRAVENOUS at 07:23

## 2024-08-17 RX ADMIN — LIDOCAINE HYDROCHLORIDE 100 MG: 20 INJECTION, SOLUTION EPIDURAL; INFILTRATION; INTRACAUDAL; PERINEURAL at 07:19

## 2024-08-17 RX ADMIN — PROPOFOL 140 MG: 10 INJECTION, EMULSION INTRAVENOUS at 07:19

## 2024-08-17 RX ADMIN — PROPOFOL 30 MG: 10 INJECTION, EMULSION INTRAVENOUS at 07:25

## 2024-08-17 RX ADMIN — PROPOFOL 40 MG: 10 INJECTION, EMULSION INTRAVENOUS at 07:21

## 2024-08-17 NOTE — ANESTHESIA PREPROCEDURE EVALUATION
Procedure:  COLONOSCOPY    Relevant Problems   CARDIO   (+) Hyperlipidemia      ENDO   (+) Hypothyroidism      GI/HEPATIC   (+) Esophageal dysphagia      MUSCULOSKELETAL   (+) Arthritis of knee   (+) Low back pain   (+) Rheumatoid arthritis (HCC)      NEURO/PSYCH   (+) Anxiety disorder   (+) Depression      PULMONARY   (+) Asthma   (+) ARNULFO on CPAP   (+) Sleep apnea      Asthma    History of transfusion age 7   Sleep apnea    CPAP (continuous positive airway pressure) dependence    Obese    Disease of thyroid gland    Colon polyp    GERD (gastroesophageal reflux disease)    Sleep difficulties    Morbid obesity   Physical Exam    Airway    Mallampati score: II  TM Distance: >3 FB  Neck ROM: full     Dental       Cardiovascular  Cardiovascular exam normal    Pulmonary  Pulmonary exam normal     Other Findings        Anesthesia Plan  ASA Score- 3     Anesthesia Type- IV sedation with anesthesia with ASA Monitors.         Additional Monitors:     Airway Plan:            Plan Factors-Exercise tolerance (METS): >4 METS.    Chart reviewed. EKG reviewed. Imaging results reviewed. Existing labs reviewed. Patient summary reviewed.                  Induction- intravenous.    Postoperative Plan-         Informed Consent- Anesthetic plan and risks discussed with patient.  I personally reviewed this patient with the CRNA. Discussed and agreed on the Anesthesia Plan with the CRNA..

## 2024-08-17 NOTE — ANESTHESIA POSTPROCEDURE EVALUATION
Post-Op Assessment Note    CV Status:  Stable  Pain Score: 0    Pain management: adequate       Mental Status:  Alert   PONV Controlled:  None   Airway Patency:  Patent     Post Op Vitals Reviewed: Yes    No anethesia notable event occurred.                BP      Temp      Pulse     Resp      SpO2

## 2024-08-17 NOTE — INTERVAL H&P NOTE
H&P reviewed. After examining the patient I find no changes in the patients condition since the H&P had been written.    Vitals:    08/17/24 0645   BP: 140/64   Pulse: 74   Resp: (!) 24   Temp: 97.8 °F (36.6 °C)   SpO2: 96%

## 2024-08-21 PROCEDURE — 88305 TISSUE EXAM BY PATHOLOGIST: CPT | Performed by: STUDENT IN AN ORGANIZED HEALTH CARE EDUCATION/TRAINING PROGRAM

## 2024-09-19 ENCOUNTER — APPOINTMENT (OUTPATIENT)
Dept: LAB | Facility: MEDICAL CENTER | Age: 59
End: 2024-09-19
Payer: COMMERCIAL

## 2024-11-13 ENCOUNTER — OFFICE VISIT (OUTPATIENT)
Dept: PHYSICAL THERAPY | Age: 59
End: 2024-11-13
Payer: COMMERCIAL

## 2024-11-13 DIAGNOSIS — R13.19 ESOPHAGEAL DYSPHAGIA: ICD-10-CM

## 2024-11-13 DIAGNOSIS — Z96.651 HISTORY OF TOTAL KNEE REPLACEMENT, RIGHT: ICD-10-CM

## 2024-11-13 DIAGNOSIS — M17.11 PRIMARY OSTEOARTHRITIS OF RIGHT KNEE: Primary | ICD-10-CM

## 2024-11-13 PROCEDURE — 97162 PT EVAL MOD COMPLEX 30 MIN: CPT | Performed by: PHYSICAL THERAPIST

## 2024-11-13 PROCEDURE — 97140 MANUAL THERAPY 1/> REGIONS: CPT | Performed by: PHYSICAL THERAPIST

## 2024-11-13 NOTE — PROGRESS NOTES
"PT Evaluation     Today's date: 2024  Patient name: João Hylton  : 1965  MRN: [id number]  Referring provider: Kleber Hoffmann MD  Dx:   Encounter Diagnosis     ICD-10-CM    1. Primary osteoarthritis of right knee  M17.11       2. History of total knee replacement, right  Z96.651                      Assessment  Impairments: abnormal gait, abnormal or restricted ROM, abnormal movement, activity intolerance, impaired physical strength, lacks appropriate home exercise program and pain with function    Assessment details: PT IE: 24.  Patient underwent right TKA on 10/29/24.  Patient noted he had home care nursing and PT.  Patient noted he had severe right knee pain and disability from right knee for 13 months.  Patient noted he trial of knee injections which were unsuccessful.  Patient noted he had left TKA in his past.  Patient noted he walks \"different\" due to right knee.  Patient noted prior to surgery he utilized a circumduction type of gait. Patient noted he will return to bowling, with right lower extremity as the push off lower extremity.  Patient noted in 2024 he and severe calf pain.  Patient noted the Doppler which was - for DVT.  Patient noted he then had right sciatica type symptoms due to gait and mobility changes.  Patient noted he has right knee edema that still persists.  Patient is utilizing kinesio taping for right knee pain changes.  Patient noted since right TKA, night time is pain aggravating.  Patient noted he has 5 of 10 pain at rest.  Patient noted he has right knee edema as the day progresses.  Patient noted he has stairs to leave his house, but he noted he is able to perform in a non reciprocal pattern. Patient noted he is + for right popliteal region baker's cyst.  Patient denies right lower extremity paresthesias.  Patient noted edema is more limiting to mobility than pain.  Patient noted extension was more limited by pain and weakness than flexion.  "   Understanding of Dx/Px/POC: excellent     Prognosis: good  Prognosis details: Patient is a 59 y.o. year old male seen for outpatient PT evaluation with pain, mobility and functional deficits due to Primary osteoarthritis of right knee [M17.11] and subsequent right TKA on 10/29/24. Patient presents to PT IE with the following problems, concerns, deficits and impairments: right knee pain, decreased right lower extremity range of motion, decreased right lower extremity mobility and strength, right knee edema, + TTP, gait and stair dysfunctions, transfer dysfunctions, functional limitations and decreased tolerance to activity.  Patient would benefit from skilled PT services under the following PT treatment plan to address the above noted deficits: therapeutic exercises and activities to facilitate right lower extremity mobility and strength, gait and stair training, transfer training, modalities, manual therapy techniques, IASTM techniques, balance and proprioception activities, kinesio taping techniques and a hep.  Thank you for the referral.     Goals  Short Term goals 4 - 6 weeks  1.  Patient will be independent HEP.   2.  Patient will report a 25 - 50% decrease in pain complaints.  3.  Increase strength 1/2 grade.  4.  Increase ROM 5-10 degrees.    Long Term goals 8 - 12 weeks  1.  Patient will report elimination of pain complaints.  2.  Patient will return to all work related activities without restriction.  3.  Patient will return to all recreational activities without restriction.  4.  ROM WFL.  5.  Strength 5/5.  6.  Patient will exhibit all transfers are without right knee symptom aggravation or limitations.  7.  Patient will exhibit gait without assistive device without right knee symptom aggravation or limitations.  8.  Patient will exhibit stair climbing in a reciprocal pattern without right knee symptom aggravation or limitations.  9.  Patient will report ability to resume recreational activities without  right knee symptom aggravation or limitations.  10.  Patient will report ability to dress without right knee symptom aggravation or limitations.  11.  Patient will report ability to sleep without deficits.    Plan  Patient would benefit from: skilled physical therapy and PT eval  Planned modality interventions: low level laser therapy, manual electrical stimulation, TENS, thermotherapy: hydrocollator packs, ultrasound, unattended electrical stimulation, cryotherapy and electrical stimulation/Russian stimulation    Planned therapy interventions: IASTM, joint mobilization, kinesiology taping, manual therapy, massage, balance, balance/weight bearing training, neuromuscular re-education, postural training, body mechanics training, self care, compression, strengthening, stretching, therapeutic activities, therapeutic exercise, therapeutic training, transfer training, flexibility, functional ROM exercises, gait training, graded activity, graded exercise, graded motor, home exercise program and IADL retraining    Frequency: 2-3x week  Duration in weeks: 12  Treatment plan discussed with: patient      Subjective Evaluation    History of Present Illness  Mechanism of injury: Patient's PMHx is remarkable for sleep apnea, Asthma, RA, IBS, hypothyroidism, throat cancer with surgical intervention with in one year follow up July, and insomnia.    History:Total knee arthroplasty .     Technique:2 radiographic views of the right knee .     Comparisons: Comparison is made to prior radiographs performed on 8/12/2024 .     Findings:     No acute fracture or dislocation visualized.     Status post right knee arthroplasty. Alignment appears satisfactory. There is   adjacent expected soft tissue swelling and soft tissue air.       Patient Goals  Patient goals for therapy: decreased pain, increased motion, increased strength, independence with ADLs/IADLs, return to sport/leisure activities and improved balance  Patient goal: to get back  to boweling  Pain  At best pain ratin  At worst pain ratin  Location: Right Knee          Objective     Tenderness     Additional Tenderness Details  Patient is + TTP at right knee at moderate to severe levels.    Active Range of Motion   Left Hip   Flexion: 100 degrees     Right Hip   Flexion: 68 degrees   Left Knee   Flexion: 114 degrees   Extension: -2 degrees   Extensor la degrees     Right Knee   Flexion: 95 degrees with pain  Extension: -10 degrees with pain  Extensor la degrees with pain  Left Ankle/Foot   Dorsiflexion (ke): 12 degrees   Plantar flexion: 50 degrees     Right Ankle/Foot   Dorsiflexion (ke): 10 degrees   Plantar flexion: 36 degrees     Strength/Myotome Testing     Left Hip   Planes of Motion   Flexion: 5  Extension: 5  Abduction: 5  Adduction: 5    Right Hip   Planes of Motion   Flexion: 4  Extension: 4  Abduction: 4  Adduction: 4+    Left Knee   Flexion: 5  Extension: 5    Right Knee   Flexion: 2-  Extension: 2-    Left Ankle/Foot   Dorsiflexion: 5  Plantar flexion: 5    Right Ankle/Foot   Dorsiflexion: 4  Plantar flexion: 4+    Ambulation     Ambulation: Level Surfaces   Ambulation with assistive device: independent    Additional Level Surfaces Ambulation Details  Patient ambulates with spc with decrease in pace, antaglic gait pattern of decrease in right stance and left swing phase.    Ambulation: Stairs   Ascend stairs: independent  Pattern: non-reciprocal  Railings: two rails  Descend stairs: independent  Pattern: non-reciprocal  Railings: two rails    General Comments:      Knee Comments  Girth Measurements:  Knee:  Suprapatellar region: Right at 63.4 CM and left at 54.8 CM;  Mid patellar region: Right at 61.2 CM and left at 52.9 Cm;  Infrapatellar region: Right at 59.6 CM and left at 50.6 Cm.    Patient has staple fixation removed as her Dr. Hoffmann's orders.  Patient is - for right TKA incision erythema, drainage and dehiscence.             Precautions: Right TKA on  10/29/24.    Patient's PMHx is remarkable for sleep apnea, Asthma, RA, IBS, hypothyroidism, and insomnia.      Manuals 11/13            Kinesio taping to right superior foot and posterior gastrocnemius region in a basket weave 10 min                         Right knee prom                          Neuro Re-Ed                                                                                                        Ther Ex                          Nu step                          Seated hr and tr:B             Seated heel slides:R             Seated LAQ:R:             Seated hip flexion:R             Seated hip abduction isometrics:B:             Seated hip adduction isometrics:B:                          Ankle pumps:R             Quad sets:R             Heel slides:R             SAQ:B:             Supine hip abduction                          Prone hip extension:R             Prone knee flexion:R             Prone tke:R                          Standing hr and tr:B:             Standing slr x 3:B:             Standing hamstring curls:R             Mini squats to the chair             Lunges              SLS and tandem stance:B:             Side stepping and tandem ambulation             Forward step ups:R             Lateral step ups:R             Step downs:R                                                    Ther Activity                                       Gait Training                                       Modalities             CP to right knee  10 min

## 2024-11-13 NOTE — LETTER
2024    Kleber Hoffmann MD  [address] [place] [address]  [facility] [id number]    Patient: João Hylton   YOB: 1965   Date of Visit: 2024     Encounter Diagnosis     ICD-10-CM    1. Primary osteoarthritis of right knee  M17.11       2. History of total knee replacement, right  Z96.651           Dear Dr. Hoffmann:    Thank you for your recent referral of João Hylton. Please review the attached evaluation summary from João's recent visit.     Please verify that you agree with the plan of care by signing the attached order.     If you have any questions or concerns, please do not hesitate to call.     I sincerely appreciate the opportunity to share in the care of one of your patients and hope to have another opportunity to work with you in the near future.       Sincerely,    Wilson Rios, PT      Referring Provider:      I certify that I have read the below Plan of Care and certify the need for these services furnished under this plan of treatment while under my care.                    Kleber Hoffmann MD  [address] [place] [address]  [facility] [id number]  Via Fax: [phone]          PT Evaluation     Today's date: 2024  Patient name: João Hylton  : 1965  MRN: [id number]  Referring provider: Kleber Hoffmann MD  Dx:   Encounter Diagnosis     ICD-10-CM    1. Primary osteoarthritis of right knee  M17.11       2. History of total knee replacement, right  Z96.651                      Assessment  Impairments: abnormal gait, abnormal or restricted ROM, abnormal movement, activity intolerance, impaired physical strength, lacks appropriate home exercise program and pain with function    Assessment details: PT IE: 24.  Patient underwent right TKA on 10/29/24.  Patient noted he had home care nursing and PT.  Patient noted he had severe right knee pain and disability from right knee for 13 months.  Patient noted he trial of knee injections which were unsuccessful.  Patient noted  "he had left TKA in his past.  Patient noted he walks \"different\" due to right knee.  Patient noted prior to surgery he utilized a circumduction type of gait. Patient noted he will return to bowling, with right lower extremity as the push off lower extremity.  Patient noted in February of 2024 he and severe calf pain.  Patient noted the Doppler which was - for DVT.  Patient noted he then had right sciatica type symptoms due to gait and mobility changes.  Patient noted he has right knee edema that still persists.  Patient is utilizing kinesio taping for right knee pain changes.  Patient noted since right TKA, night time is pain aggravating.  Patient noted he has 5 of 10 pain at rest.  Patient noted he has right knee edema as the day progresses.  Patient noted he has stairs to leave his house, but he noted he is able to perform in a non reciprocal pattern. Patient noted he is + for right popliteal region baker's cyst.  Patient denies right lower extremity paresthesias.  Patient noted edema is more limiting to mobility than pain.  Patient noted extension was more limited by pain and weakness than flexion.    Understanding of Dx/Px/POC: excellent     Prognosis: good  Prognosis details: Patient is a 59 y.o. year old male seen for outpatient PT evaluation with pain, mobility and functional deficits due to Primary osteoarthritis of right knee [M17.11] and subsequent right TKA on 10/29/24. Patient presents to PT IE with the following problems, concerns, deficits and impairments: right knee pain, decreased right lower extremity range of motion, decreased right lower extremity mobility and strength, right knee edema, + TTP, gait and stair dysfunctions, transfer dysfunctions, functional limitations and decreased tolerance to activity.  Patient would benefit from skilled PT services under the following PT treatment plan to address the above noted deficits: therapeutic exercises and activities to facilitate right lower extremity " mobility and strength, gait and stair training, transfer training, modalities, manual therapy techniques, IASTM techniques, balance and proprioception activities, kinesio taping techniques and a hep.  Thank you for the referral.     Goals  Short Term goals 4 - 6 weeks  1.  Patient will be independent HEP.   2.  Patient will report a 25 - 50% decrease in pain complaints.  3.  Increase strength 1/2 grade.  4.  Increase ROM 5-10 degrees.    Long Term goals 8 - 12 weeks  1.  Patient will report elimination of pain complaints.  2.  Patient will return to all work related activities without restriction.  3.  Patient will return to all recreational activities without restriction.  4.  ROM WFL.  5.  Strength 5/5.  6.  Patient will exhibit all transfers are without right knee symptom aggravation or limitations.  7.  Patient will exhibit gait without assistive device without right knee symptom aggravation or limitations.  8.  Patient will exhibit stair climbing in a reciprocal pattern without right knee symptom aggravation or limitations.  9.  Patient will report ability to resume recreational activities without right knee symptom aggravation or limitations.  10.  Patient will report ability to dress without right knee symptom aggravation or limitations.  11.  Patient will report ability to sleep without deficits.    Plan  Patient would benefit from: skilled physical therapy and PT eval  Planned modality interventions: low level laser therapy, manual electrical stimulation, TENS, thermotherapy: hydrocollator packs, ultrasound, unattended electrical stimulation, cryotherapy and electrical stimulation/Russian stimulation    Planned therapy interventions: IASTM, joint mobilization, kinesiology taping, manual therapy, massage, balance, balance/weight bearing training, neuromuscular re-education, postural training, body mechanics training, self care, compression, strengthening, stretching, therapeutic activities, therapeutic  exercise, therapeutic training, transfer training, flexibility, functional ROM exercises, gait training, graded activity, graded exercise, graded motor, home exercise program and IADL retraining    Frequency: 2-3x week  Duration in weeks: 12  Treatment plan discussed with: patient      Subjective Evaluation    History of Present Illness  Mechanism of injury: Patient's PMHx is remarkable for sleep apnea, Asthma, RA, IBS, hypothyroidism, throat cancer with surgical intervention with in one year follow up July, and insomnia.    History:Total knee arthroplasty .     Technique:2 radiographic views of the right knee .     Comparisons: Comparison is made to prior radiographs performed on 2024 .     Findings:     No acute fracture or dislocation visualized.     Status post right knee arthroplasty. Alignment appears satisfactory. There is   adjacent expected soft tissue swelling and soft tissue air.       Patient Goals  Patient goals for therapy: decreased pain, increased motion, increased strength, independence with ADLs/IADLs, return to sport/leisure activities and improved balance  Patient goal: to get back to boweling  Pain  At best pain ratin  At worst pain ratin  Location: Right Knee          Objective     Tenderness     Additional Tenderness Details  Patient is + TTP at right knee at moderate to severe levels.    Active Range of Motion   Left Hip   Flexion: 100 degrees     Right Hip   Flexion: 68 degrees   Left Knee   Flexion: 114 degrees   Extension: -2 degrees   Extensor la degrees     Right Knee   Flexion: 95 degrees with pain  Extension: -10 degrees with pain  Extensor la degrees with pain  Left Ankle/Foot   Dorsiflexion (ke): 12 degrees   Plantar flexion: 50 degrees     Right Ankle/Foot   Dorsiflexion (ke): 10 degrees   Plantar flexion: 36 degrees     Strength/Myotome Testing     Left Hip   Planes of Motion   Flexion: 5  Extension: 5  Abduction: 5  Adduction: 5    Right Hip   Planes of  Motion   Flexion: 4  Extension: 4  Abduction: 4  Adduction: 4+    Left Knee   Flexion: 5  Extension: 5    Right Knee   Flexion: 2-  Extension: 2-    Left Ankle/Foot   Dorsiflexion: 5  Plantar flexion: 5    Right Ankle/Foot   Dorsiflexion: 4  Plantar flexion: 4+    Ambulation     Ambulation: Level Surfaces   Ambulation with assistive device: independent    Additional Level Surfaces Ambulation Details  Patient ambulates with spc with decrease in pace, antaglic gait pattern of decrease in right stance and left swing phase.    Ambulation: Stairs   Ascend stairs: independent  Pattern: non-reciprocal  Railings: two rails  Descend stairs: independent  Pattern: non-reciprocal  Railings: two rails    General Comments:      Knee Comments  Girth Measurements:  Knee:  Suprapatellar region: Right at 63.4 CM and left at 54.8 CM;  Mid patellar region: Right at 61.2 CM and left at 52.9 Cm;  Infrapatellar region: Right at 59.6 CM and left at 50.6 Cm.    Patient has staple fixation removed as her Dr. Hoffmann's orders.  Patient is - for right TKA incision erythema, drainage and dehiscence.             Precautions: Right TKA on 10/29/24.    Patient's PMHx is remarkable for sleep apnea, Asthma, RA, IBS, hypothyroidism, and insomnia.      Manuals 11/13            Kinesio taping to right superior foot and posterior gastrocnemius region in a basket weave 10 min                         Right knee prom                          Neuro Re-Ed                                                                                                        Ther Ex                          Nu step                          Seated hr and tr:B             Seated heel slides:R             Seated LAQ:R:             Seated hip flexion:R             Seated hip abduction isometrics:B:             Seated hip adduction isometrics:B:                          Ankle pumps:R             Quad sets:R             Heel slides:R             SAQ:B:             Supine hip  abduction                          Prone hip extension:R             Prone knee flexion:R             Prone tke:R                          Standing hr and tr:B:             Standing slr x 3:B:             Standing hamstring curls:R             Mini squats to the chair             Lunges              SLS and tandem stance:B:             Side stepping and tandem ambulation             Forward step ups:R             Lateral step ups:R             Step downs:R                                                    Ther Activity                                       Gait Training                                       Modalities             CP to right knee  10 min

## 2024-11-13 NOTE — TELEPHONE ENCOUNTER
Reason for call:   [x] Refill   [] Prior Auth  [] Other:     Office:   [] PCP/Provider -   [x] Specialty/Provider - Dr. Anaya     Medication: pantoprazole     Dose/Frequency: 40 mg daily     Quantity: 90D     Pharmacy: [facility] on file     Does the patient have enough for 3 days?   [x] Yes   [] No - Send as HP to POD

## 2024-11-14 RX ORDER — PANTOPRAZOLE SODIUM 40 MG/1
40 TABLET, DELAYED RELEASE ORAL DAILY
Qty: 90 TABLET | Refills: 1 | Status: SHIPPED | OUTPATIENT
Start: 2024-11-14

## 2024-11-15 ENCOUNTER — OFFICE VISIT (OUTPATIENT)
Dept: PHYSICAL THERAPY | Age: 59
End: 2024-11-15
Payer: COMMERCIAL

## 2024-11-15 DIAGNOSIS — M17.11 PRIMARY OSTEOARTHRITIS OF RIGHT KNEE: Primary | ICD-10-CM

## 2024-11-15 PROCEDURE — 97110 THERAPEUTIC EXERCISES: CPT

## 2024-11-15 NOTE — PROGRESS NOTES
"Daily Note     Today's date: 11/15/2024  Patient name: João Hylton  : 1965  MRN: [id number]  Referring provider: Kleber Hoffmann MD  Dx:   Encounter Diagnosis     ICD-10-CM    1. Primary osteoarthritis of right knee  M17.11                      Subjective: Pt noted \"my leg is swollen. My bakers' cyst is swollen\"       Objective: See treatment diary below      Assessment: Fair tolerance to exercises. Decreased edema with exercises and modalities.       Plan: Cont with plan of care      Precautions: Right TKA on 10/29/24.    Patient's PMHx is remarkable for sleep apnea, Asthma, RA, IBS, hypothyroidism, and insomnia.      Manuals 11/13 11/15           Kinesio taping to right superior foot and posterior gastrocnemius region in a basket weave 10 min NT    Ktape to R post knee                         Right knee prom  NT                        Neuro Re-Ed                                       Ther Ex                          Nu step  12 min                         Seated hr and tr:B  20x            Seated heel slides:R  NT           Seated LAQ:R:  20x            Seated hip flexion:R  20x            Seated hip abduction isometrics:B:  NT           Seated hip adduction isometrics:B:  NT                        Ankle pumps:R  NT           Quad sets:R  NT           Heel slides:R  20X            SAQ:B:  20X 3SEC            Supine hip abduction  NT                        Prone hip extension:R  NT           Prone knee flexion:R  20X            Prone tke:R  20X 3SEC                         Standing hr and tr:B:   20X            Standing slr x 3:B:   20X            Standing hamstring curls:R  20X            Mini squats to the chair  20X            Lunges   20X            SLS and tandem stance:B:  30SEC 5X            Side stepping and tandem ambulation  NT           Forward step ups:R  NT           Lateral step ups:R  NT           Step downs:R  NT                                                  Ther Activity                   "                     Gait Training                                       Modalities             CP to right knee  10 min 10 MIN

## 2024-11-18 ENCOUNTER — OFFICE VISIT (OUTPATIENT)
Dept: PHYSICAL THERAPY | Age: 59
End: 2024-11-18
Payer: COMMERCIAL

## 2024-11-18 DIAGNOSIS — M17.11 PRIMARY OSTEOARTHRITIS OF RIGHT KNEE: Primary | ICD-10-CM

## 2024-11-18 DIAGNOSIS — Z96.651 HISTORY OF TOTAL KNEE REPLACEMENT, RIGHT: ICD-10-CM

## 2024-11-18 PROCEDURE — 97140 MANUAL THERAPY 1/> REGIONS: CPT | Performed by: PHYSICAL THERAPIST

## 2024-11-18 PROCEDURE — 97110 THERAPEUTIC EXERCISES: CPT | Performed by: PHYSICAL THERAPIST

## 2024-11-18 NOTE — PROGRESS NOTES
Daily Note     Today's date: 2024  Patient name: João Hylton  : 1965  MRN: [id number]  Referring provider: Kleber Hoffmann MD  Dx:   Encounter Diagnosis     ICD-10-CM    1. Primary osteoarthritis of right knee  M17.11       2. History of total knee replacement, right  Z96.651                      Subjective: Patient reported right posterior hamstring region pain is at a 5-6 of 10 and his primary painful region limiting all weight baring activities.       Objective: See treatment diary below.      Assessment: Patient presents with right lower extremity fatigue symptoms limiting kinetic chain activities that mimics his standing deficits and functional limitations.  Patient exhibits right knee mobility, strength and quad control improving since onset of PT, despite pain at end range and right lower extremity fatigue and weakness limiting both PT treatment and self functional mobility.  Patient exhibits right knee mobility progress as follows: flexion arom at 104 and prom at 108, extension arom at -6 and QL at 14 degrees.  Patient exhibit right knee edema decrease with suprapatellar region edema at 60.4 cm; midpatellar region edema at 59.1 cm and infrapatellar region edema at 56.1 cm.  Thus, PT is warranted to facilitate right lower extremity mobility, strength, pain reduction to promote full functional progress to self, house hold and work activities.       Plan: Cont with plan of care      Precautions: Right TKA on 10/29/24.    Patient's PMHx is remarkable for sleep apnea, Asthma, RA, IBS, hypothyroidism, and insomnia.      Manuals 11/13 11/15 11/18          Kinesio taping to right superior foot and posterior gastrocnemius region in a basket weave 10 min NT    Ktape to R post knee  Posterior central thigh and lateral hamstring tendon                       Right knee prom  NT 10 min with retrograde massage                       Neuro Re-Ed                                       Ther Ex                        "   Nu step  12 min  10 min                       Seated hr and tr:B  20x  NT          Seated heel slides:R  NT NT          Seated LAQ:R:  20x  2 x 10          Seated hip flexion:R  20x  2 x 10          Seated hip abduction isometrics:B:  NT NT          Seated hip adduction isometrics:B:  NT NT                       Ankle pumps:R  NT NT          Quad sets:R  NT NT          Heel slides:R  20X  2 x 10          SAQ:B:  20X 3SEC  2 x 10          Supine hip abduction  NT NT                       Prone hip extension:R  NT NT          Prone knee flexion:R  20X  Self prom 20 sec x 5          Prone tke:R  20X 3SEC  NT                       Standing hr and tr:B:   20X  2 x 10          Standing slr x 3:B:   20X  2 x 10          Standing hamstring curls:R  20X  2 x 10          Mini squats to the chair  20X  2 x 10          Lunges   20X  2 x 10          SLS and tandem stance:B:  30SEC 5X  30 sec x 4          Side stepping and tandem ambulation  NT On 10 feet of airex 4 x           Forward step ups:R  NT 6\" x 20          Lateral step ups:R  NT 6\" x 20          Step downs:R  NT NT 4\"                                                Ther Activity                                       Gait Training                                       Modalities             CP to right knee  10 min 10 MIN                              "

## 2024-11-20 ENCOUNTER — OFFICE VISIT (OUTPATIENT)
Dept: PHYSICAL THERAPY | Age: 59
End: 2024-11-20
Payer: COMMERCIAL

## 2024-11-20 DIAGNOSIS — Z96.651 HISTORY OF TOTAL KNEE REPLACEMENT, RIGHT: ICD-10-CM

## 2024-11-20 DIAGNOSIS — M17.11 PRIMARY OSTEOARTHRITIS OF RIGHT KNEE: Primary | ICD-10-CM

## 2024-11-20 PROCEDURE — 97110 THERAPEUTIC EXERCISES: CPT | Performed by: PHYSICAL THERAPIST

## 2024-11-20 PROCEDURE — 97140 MANUAL THERAPY 1/> REGIONS: CPT | Performed by: PHYSICAL THERAPIST

## 2024-11-20 NOTE — PROGRESS NOTES
Daily Note     Today's date: 2024  Patient name: João Hylton  : 1965  MRN: [id number]  Referring provider: Kleber Hoffmann MD  Dx:   Encounter Diagnosis     ICD-10-CM    1. Primary osteoarthritis of right knee  M17.11       2. History of total knee replacement, right  Z96.651                      Subjective: Patient reported right posterior hamstring region pain is at a 5 of 10 and his primary painful region limiting all weight baring activities.  Patient noted right TKA incision is well healed, but he has popliteal and posterior lateral right knee as his painful location.        Objective: See treatment diary below.      Assessment: Patient presents with right lower extremity fatigue symptoms limiting kinetic chain activities that mimics his standing deficits and functional limitations.  Patient exhibits right knee mobility progress as follows: flexion arom at 108 and prom at 110, extension arom at -6 and QL at 14 degrees.  Patient exhibits with pain at end range with both flexion and extension, that limits standing, walking and transfers since pain at each flexion and extension are aggravated with those functional activities and thus use of spc to improve function and decrease fall risk.  Thus, PT is warranted to facilitate right lower extremity mobility, strength, pain reduction to promote full functional progress to self, house hold and work activities.       Plan: Cont with plan of care      Precautions: Right TKA on 10/29/24.    Patient's PMHx is remarkable for sleep apnea, Asthma, RA, IBS, hypothyroidism, and insomnia.      Manuals 11/13 11/15 11/18 11/20         Kinesio taping to right superior foot and posterior gastrocnemius region in a basket weave 10 min NT    Ktape to R post knee  Posterior central thigh and lateral hamstring tendon NT                      Right knee prom  NT 10 min with retrograde massage 10 min with retrograde massage                      Neuro Re-Ed                        "                Ther Ex                          Nu step  12 min  10 min 12 min                      Seated hr and tr:B  20x  NT NT         Seated heel slides:R  NT NT NT         Seated LAQ:R:  20x  2 x 10 2 x 10         Seated hip flexion:R  20x  2 x 10 NT         Seated hip abduction isometrics:B:  NT NT NT         Seated hip adduction isometrics:B:  NT NT NT                      Ankle pumps:R  NT NT NT         Quad sets:R  NT NT NT         Heel slides:R  20X  2 x 10 2 x 10         SAQ:B:  20X 3SEC  2 x 10 NT         Supine hip abduction  NT NT NT                      Prone hip extension:R  NT NT 2 x 10         Prone knee flexion:R  20X  Self prom 20 sec x 5 20 sec x 5         Prone tke:R  20X 3SEC  NT NT                      Knee flexion stretch on 2 nd step:B:   20 sec x 5 20 sec x 5         Knee extension stretch off 1 st stretch:B:    20 sec x 5                      Standing hr and tr:B:   20X  2 x 10 2 x 10         Standing slr x 3:B:   20X  2 x 10 2 x 10         Standing hamstring curls:R  20X  2 x 10 2 x 10         Mini squats to the chair  20X  2 x 10 2 x 10         Lunges   20X  2 x 10 2 x 10         SLS and tandem stance:B:  30SEC 5X  30 sec x 4 30 sec x 4         Side stepping and tandem ambulation  NT On 10 feet of airex 4 x  10 feet airex at ballet bar x 4         Forward step ups:R  NT 6\" x 20 6\" x 20         Lateral step ups:R  NT 6\" x 20 6\" x 20         Step downs:R  NT NT 4\" NT add                     Cybex leg press     assess                     Ther Activity                                       Gait Training                                       Modalities             CP to right knee  10 min 10 MIN  10 min 10 min                           "

## 2024-11-26 ENCOUNTER — APPOINTMENT (OUTPATIENT)
Dept: PHYSICAL THERAPY | Age: 59
End: 2024-11-26
Payer: COMMERCIAL

## 2024-11-29 ENCOUNTER — OFFICE VISIT (OUTPATIENT)
Dept: PHYSICAL THERAPY | Age: 59
End: 2024-11-29
Payer: COMMERCIAL

## 2024-11-29 DIAGNOSIS — Z96.651 HISTORY OF TOTAL KNEE REPLACEMENT, RIGHT: ICD-10-CM

## 2024-11-29 DIAGNOSIS — M17.11 PRIMARY OSTEOARTHRITIS OF RIGHT KNEE: Primary | ICD-10-CM

## 2024-11-29 PROCEDURE — 97110 THERAPEUTIC EXERCISES: CPT | Performed by: PHYSICAL THERAPIST

## 2024-11-29 PROCEDURE — 97140 MANUAL THERAPY 1/> REGIONS: CPT | Performed by: PHYSICAL THERAPIST

## 2024-11-29 NOTE — PROGRESS NOTES
Daily Note     Today's date: 2024  Patient name: João Hylton  : 1965  MRN: [id number]  Referring provider: Kleber Hoffmann MD  Dx:   Encounter Diagnosis     ICD-10-CM    1. Primary osteoarthritis of right knee  M17.11       2. History of total knee replacement, right  Z96.651                      Subjective: Patient reported right posterior hamstring region pain is at a 3 of 10 and his primary painful region limiting all weight baring activities.  Patient reported posterior fibular head region remains painful and limiting all standing, walking and knee flexion based functional activities.  Patient noted right TKA incision is well healed, but he has popliteal and posterior lateral right knee as his painful location.        Objective: See treatment diary below.      Assessment: Patient presents with right lower extremity fatigue symptoms limiting closed kinetic chain activities that mimics his standing deficits and functional limitations.  Patient exhibits right knee mobility progress as follows: flexion arom at 115 and prom at 120, extension arom at -5 and prom at -2 and QL at 12 degrees.  Patient exhibits with pain at end range with both flexion and extension, that limits standing, walking and transfers since pain at each flexion and extension are aggravated with those functional activities and thus use of spc to improve function and decrease fall risk.  Patient presents with central / lateral popliteal region TTP / painful region limiting prolonged standing thus addition kinesio taping utilized for pain reduction and mobility / functional progress.  Thus, PT is warranted to facilitate right lower extremity mobility, strength, pain reduction to promote full functional progress to self, house hold and work activities.       Plan: Cont with plan of care      Precautions: Right TKA on 10/29/24.    Patient's PMHx is remarkable for sleep apnea, Asthma, RA, IBS, hypothyroidism, and insomnia.      Manuals  "11/13 11/15 11/18 11/20 11/29        Kinesio taping to right superior foot and posterior gastrocnemius region in a basket weave 10 min NT    Ktape to R post knee  Posterior central thigh and lateral hamstring tendon NT Posterior fibular head moving KT tape fwd, anteriorly and distal at 100% tension x 10 min                     Right knee prom  NT 10 min with retrograde massage 10 min with retrograde massage 10 min total with patient's spouse taught proper technique                     Neuro Re-Ed                                       Ther Ex                          Nu step  12 min  10 min 12 min 12 min                     Seated hr and tr:B  20x  NT NT NT        Seated heel slides:R  NT NT NT NT        Seated LAQ:R:  20x  2 x 10 2 x 10 2# x 20        Seated hip flexion:R  20x  2 x 10 NT 2# x 20        Seated hip abduction isometrics:B:  NT NT NT NT        Seated hip adduction isometrics:B:  NT NT NT NT                     Ankle pumps:R  NT NT NT NT        Quad sets:R  NT NT NT NT        Heel slides:R  20X  2 x 10 2 x 10 NT        SAQ:B:  20X 3SEC  2 x 10 NT NT        Supine hip abduction  NT NT NT NT                     Prone hip extension:R  NT NT 2 x 10 NT        Prone knee flexion:R  20X  Self prom 20 sec x 5 20 sec x 5 NT        Prone tke:R  20X 3SEC  NT NT NT                     Knee flexion stretch on 2 nd step:B:   20 sec x 5 20 sec x 5 20 sec x 5        Knee extension stretch off 1 st stretch:B:    20 sec x 5 20 sec x 5                     Standing hr and tr:B:   20X  2 x 10 2 x 10 NT        Standing slr x 3:B:   20X  2 x 10 2 x 10 2# x 20        Standing hamstring curls:R  20X  2 x 10 2 x 10 2# x 20        Mini squats to the chair  20X  2 x 10 2 x 10 2 x 10        Lunges   20X  2 x 10 2 x 10 NT        SLS and tandem stance:B:  30SEC 5X  30 sec x 4 30 sec x 4 NT        Side stepping and tandem ambulation  NT On 10 feet of airex 4 x  10 feet airex at ballet bar x 4 NT        Forward step ups:R  NT 6\" x 20 6\" " "x 20 6\" x 30        Lateral step ups:R  NT 6\" x 20 6\" x 20 6\" x 30        Step downs:R  NT NT 4\" NT 6\" x 20                     Cybex leg press     Leg press and resistive  # x 30 of each                     Ther Activity                                       Gait Training                                       Modalities             CP to right knee  10 min 10 MIN  10 min 10 min 10 min                          "

## 2024-12-02 ENCOUNTER — OFFICE VISIT (OUTPATIENT)
Dept: PHYSICAL THERAPY | Age: 59
End: 2024-12-02
Payer: COMMERCIAL

## 2024-12-02 DIAGNOSIS — M17.11 PRIMARY OSTEOARTHRITIS OF RIGHT KNEE: Primary | ICD-10-CM

## 2024-12-02 DIAGNOSIS — Z96.651 HISTORY OF TOTAL KNEE REPLACEMENT, RIGHT: ICD-10-CM

## 2024-12-02 PROCEDURE — 97112 NEUROMUSCULAR REEDUCATION: CPT | Performed by: PHYSICAL THERAPIST

## 2024-12-02 PROCEDURE — 97140 MANUAL THERAPY 1/> REGIONS: CPT | Performed by: PHYSICAL THERAPIST

## 2024-12-02 PROCEDURE — 97110 THERAPEUTIC EXERCISES: CPT | Performed by: PHYSICAL THERAPIST

## 2024-12-02 NOTE — PROGRESS NOTES
Daily Note     Today's date: 2024  Patient name: João Hylton  : 1965  MRN: [id number]  Referring provider: Kleber Hoffmann MD  Dx:   Encounter Diagnosis     ICD-10-CM    1. Primary osteoarthritis of right knee  M17.11       2. History of total knee replacement, right  Z96.651                      Subjective: Patient reported right posterior hamstring and popliteal region pain persists at a 3 of 10 but he had right knee edema last night / this am and sleep was deprived.  Patient noted he has not used the spc since last PT visit.  Patient noted car transfers remain limited by right knee pain.  Patient reported posterior fibular head / popliteal region remains painful and limiting all standing, walking and knee flexion based functional activities.  Patient noted he is able to resume his medications of gabapentin, diclofenac and Humera. Patient noted donning and doffing his right sock remains limited as well as he is unable to make a full revolution on the stationary bike at home.      Objective: See treatment diary below.      Assessment: Patient presents with improving right knee mobility, strength and control as exhibited by stair ascent and descent in a reciprocal pattern and use of leg press for strengthening.  But, he continues to exhibit right popliteal and fibular head region pain that limits transfers, standing, walking, stair climbing, squatting and kneeling and thus he is unable to resume work activities.  Thus, PT is warranted to facilitate right lower extremity mobility, strength, pain reduction to promote full functional progress to self, house hold and work activities.       Plan: Cont with plan of care      Precautions: Right TKA on 10/29/24.    Patient's PMHx is remarkable for sleep apnea, Asthma, RA, IBS, hypothyroidism, and insomnia.      Manuals 11/13 11/15 11/18 11/20 11/29 12/2       Kinesio taping to right superior foot and posterior gastrocnemius region in a basket weave 10 min  NT    Ktape to R post knee  Posterior central thigh and lateral hamstring tendon NT Posterior fibular head moving KT tape fwd, anteriorly and distal at 100% tension x 10 min 10 min total                    Right knee prom  NT 10 min with retrograde massage 10 min with retrograde massage 10 min total with patient's spouse taught proper technique 10 min total                    Neuro Re-Ed                                       Ther Ex                          Nu step  12 min  10 min 12 min 12 min 12 min                    Seated hr and tr:B  20x  NT NT NT NT       Seated heel slides:R  NT NT NT NT NT       Seated LAQ:R:  20x  2 x 10 2 x 10 2# x 20 NT       Seated hip flexion:R  20x  2 x 10 NT 2# x 20 NT       Seated hip abduction isometrics:B:  NT NT NT NT NT       Seated hip adduction isometrics:B:  NT NT NT NT NT                    Ankle pumps:R  NT NT NT NT NT       Quad sets:R  NT NT NT NT NT       Heel slides:R  20X  2 x 10 2 x 10 NT 2 x 10       SAQ:B:  20X 3SEC  2 x 10 NT NT NT       Supine hip abduction  NT NT NT NT NT                    Prone hip extension:R  NT NT 2 x 10 NT NT       Prone knee flexion:R  20X  Self prom 20 sec x 5 20 sec x 5 NT 2 x 10       Prone tke:R  20X 3SEC  NT NT NT NT                    Knee flexion stretch on 2 nd step:B:   20 sec x 5 20 sec x 5 20 sec x 5 20 sec x 5       Knee extension stretch off 1 st stretch:B:    20 sec x 5 20 sec x 5 20 sec x 5       Standing  hamstring stretch:R      20 sec x 5                    Standing hr and tr:B:   20X  2 x 10 2 x 10 NT NT       Standing slr x 3:B:   20X  2 x 10 2 x 10 2# x 20 NT       Standing hamstring curls:R  20X  2 x 10 2 x 10 2# x 20 NT       Mini squats to the chair  20X  2 x 10 2 x 10 2 x 10 2 x 10       Lunges on   20X  2 x 10 2 x 10 NT 2 x 10       SLS and tandem stance:B:  30SEC 5X  30 sec x 4 30 sec x 4 NT NT       Side stepping and tandem ambulation  NT On 10 feet of airex 4 x  10 feet airex at ballet bar x 4 NT NT      "  Forward step ups:R  NT 6\" x 20 6\" x 20 6\" x 30 8\" x 20       Lateral step ups:R  NT 6\" x 20 6\" x 20 6\" x 30 8\" x 20       Step downs:R  NT NT 4\" NT 6\" x 20 6\" x 20                    Cybex leg press     Leg press and resistive  # x 30 of each Leg press and resistive  # x 30 of each                    Ther Activity                                       Gait Training                                       Modalities             CP to right knee  10 min 10 MIN  10 min 10 min 10 min 10 min                         "

## 2024-12-05 ENCOUNTER — OFFICE VISIT (OUTPATIENT)
Dept: PHYSICAL THERAPY | Age: 59
End: 2024-12-05
Payer: COMMERCIAL

## 2024-12-05 DIAGNOSIS — M17.11 PRIMARY OSTEOARTHRITIS OF RIGHT KNEE: Primary | ICD-10-CM

## 2024-12-05 DIAGNOSIS — Z96.651 HISTORY OF TOTAL KNEE REPLACEMENT, RIGHT: ICD-10-CM

## 2024-12-05 PROCEDURE — 97112 NEUROMUSCULAR REEDUCATION: CPT | Performed by: PHYSICAL THERAPIST

## 2024-12-05 PROCEDURE — 97140 MANUAL THERAPY 1/> REGIONS: CPT | Performed by: PHYSICAL THERAPIST

## 2024-12-05 PROCEDURE — 97110 THERAPEUTIC EXERCISES: CPT | Performed by: PHYSICAL THERAPIST

## 2024-12-05 NOTE — PROGRESS NOTES
Daily Note     Today's date: 2024  Patient name: João Hylton  : 1965  MRN: [id number]  Referring provider: Kleber Hoffmann MD  Dx:   Encounter Diagnosis     ICD-10-CM    1. Primary osteoarthritis of right knee  M17.11       2. History of total knee replacement, right  Z96.651                      Subjective: patient offers no new complaints. He mentions that he can ascend/descend steps in a reciprocal pattern now and was able to put on his right sock without an issue.       Objective: See treatment diary below      Assessment: Tolerated treatment well. Continued with program as noted below. He requires cueing to slow down leg press and not rush through the motion. Good carry over after cueing. Added cybex leg extension and leg curl today, no reports of increased symptoms. He demonstrates PROM WFL. Patient demonstrated fatigue post treatment and would benefit from continued PT      Plan: Continue per plan of care.        Precautions: Right TKA on 10/29/24.    Patient's PMHx is remarkable for sleep apnea, Asthma, RA, IBS, hypothyroidism, and insomnia.      Manuals 11/13 11/15 11/18 11/20 11/29 12/2 12/5      Kinesio taping to right superior foot and posterior gastrocnemius region in a basket weave 10 min NT    Ktape to R post knee  Posterior central thigh and lateral hamstring tendon NT Posterior fibular head moving KT tape fwd, anteriorly and distal at 100% tension x 10 min 10 min total                    Right knee prom  NT 10 min with retrograde massage 10 min with retrograde massage 10 min total with patient's spouse taught proper technique 10 min total 5 min                   Neuro Re-Ed                                       Ther Ex                          Nu step  12 min  10 min 12 min 12 min 12 min 12 min                   Seated hr and tr:B  20x  NT NT NT NT       Seated heel slides:R  NT NT NT NT NT       Seated LAQ:R:  20x  2 x 10 2 x 10 2# x 20 NT       Seated hip flexion:R  20x  2 x 10 NT 2# x  "20 NT       Seated hip abduction isometrics:B:  NT NT NT NT NT       Seated hip adduction isometrics:B:  NT NT NT NT NT                    Ankle pumps:R  NT NT NT NT NT       Quad sets:R  NT NT NT NT NT       Heel slides:R  20X  2 x 10 2 x 10 NT 2 x 10 2x10      SAQ:B:  20X 3SEC  2 x 10 NT NT NT       Supine hip abduction  NT NT NT NT NT                    Prone hip extension:R  NT NT 2 x 10 NT NT       Prone knee flexion:R  20X  Self prom 20 sec x 5 20 sec x 5 NT 2 x 10       Prone tke:R  20X 3SEC  NT NT NT NT                    Knee flexion stretch on 2 nd step:B:   20 sec x 5 20 sec x 5 20 sec x 5 20 sec x 5 20\"x5      Knee extension stretch off 1 st stretch:B:    20 sec x 5 20 sec x 5 20 sec x 5 20\"x5      Standing  hamstring stretch:R      20 sec x 5 20\"x5                   Standing hr and tr:B:   20X  2 x 10 2 x 10 NT NT       Standing slr x 3:B:   20X  2 x 10 2 x 10 2# x 20 NT       Standing hamstring curls:R  20X  2 x 10 2 x 10 2# x 20 NT       Mini squats to the chair  20X  2 x 10 2 x 10 2 x 10 2 x 10       Lunges on   20X  2 x 10 2 x 10 NT 2 x 10       SLS and tandem stance:B:  30SEC 5X  30 sec x 4 30 sec x 4 NT NT       Side stepping and tandem ambulation  NT On 10 feet of airex 4 x  10 feet airex at ballet bar x 4 NT NT       Forward step ups:R  NT 6\" x 20 6\" x 20 6\" x 30 8\" x 20 8\" x20      Lateral step ups:R  NT 6\" x 20 6\" x 20 6\" x 30 8\" x 20 8\" x20      Step downs:R  NT NT 4\" NT 6\" x 20 6\" x 20 6\"x20                   Cybex leg press     Leg press and resistive  # x 30 of each Leg press and resistive  # x 30 of each Leg press and resistive # x 30 of each    # x20      Cybex Claude Extension       48# x10  36# x10      Cybex Leg Curll        50#    x20      Ther Activity                                       Gait Training                                       Modalities             CP to right knee  10 min 10 MIN  10 min 10 min 10 min 10 min 10 min                          "

## 2024-12-10 ENCOUNTER — OFFICE VISIT (OUTPATIENT)
Dept: PHYSICAL THERAPY | Age: 59
End: 2024-12-10
Payer: COMMERCIAL

## 2024-12-10 DIAGNOSIS — Z96.651 HISTORY OF TOTAL KNEE REPLACEMENT, RIGHT: Primary | ICD-10-CM

## 2024-12-10 DIAGNOSIS — M17.11 PRIMARY OSTEOARTHRITIS OF RIGHT KNEE: ICD-10-CM

## 2024-12-10 PROCEDURE — 97110 THERAPEUTIC EXERCISES: CPT

## 2024-12-10 PROCEDURE — 97750 PHYSICAL PERFORMANCE TEST: CPT

## 2024-12-10 NOTE — PROGRESS NOTES
"Daily Note     Today's date: 12/10/2024  Patient name: João Hylton  : 1965  MRN: [id number]  Referring provider: Kleber Hoffmann MD  Dx:   Encounter Diagnosis     ICD-10-CM    1. History of total knee replacement, right  Z96.651       2. Primary osteoarthritis of right knee  M17.11                        Subjective:  Pt reported \"if it wasn't for the sciatic pain I would be ok\"      Objective: See treatment diary below      Assessment: Sciati  pain restricts patient from progressing with R knee ROM. Progress as able       Plan: Continue per plan of care.        Precautions: Right TKA on 10/29/24.    Patient's PMHx is remarkable for sleep apnea, Asthma, RA, IBS, hypothyroidism, and insomnia.      Manuals 11/13 11/15 11/18 11/20 11/29 12/2 12/5 12/10     Kinesio taping to right superior foot and posterior gastrocnemius region in a basket weave 10 min NT    Ktape to R post knee  Posterior central thigh and lateral hamstring tendon NT Posterior fibular head moving KT tape fwd, anteriorly and distal at 100% tension x 10 min 10 min total  NT                  Right knee prom  NT 10 min with retrograde massage 10 min with retrograde massage 10 min total with patient's spouse taught proper technique 10 min total 5 min NT                  Neuro Re-Ed                                       Ther Ex                          Nu step  12 min  10 min 12 min 12 min 12 min 12 min 12 min                   Seated hr and tr:B  20x  NT NT NT NT  NT     Seated heel slides:R  NT NT NT NT NT  NT     Seated LAQ:R:  20x  2 x 10 2 x 10 2# x 20 NT  NT     Seated hip flexion:R  20x  2 x 10 NT 2# x 20 NT  NT     Seated hip abduction isometrics:B:  NT NT NT NT NT  NT     Seated hip adduction isometrics:B:  NT NT NT NT NT  NT                  Ankle pumps:R  NT NT NT NT NT  NT     Quad sets:R  NT NT NT NT NT  NT     Heel slides:R  20X  2 x 10 2 x 10 NT 2 x 10 2x10 NT     SAQ:B:  20X 3SEC  2 x 10 NT NT NT  NT     Supine hip abduction  NT NT NT " "NT NT  NT                  Prone hip extension:R  NT NT 2 x 10 NT NT  NT     Prone knee flexion:R  20X  Self prom 20 sec x 5 20 sec x 5 NT 2 x 10  NT     Prone tke:R  20X 3SEC  NT NT NT NT  NT                  Knee flexion stretch on 2 nd step:B:   20 sec x 5 20 sec x 5 20 sec x 5 20 sec x 5 20\"x5 20sec 5x      Knee extension stretch off 1 st stretch:B:    20 sec x 5 20 sec x 5 20 sec x 5 20\"x5 20sec 5x      Standing  hamstring stretch:R      20 sec x 5 20\"x5 20sec 5x                  Standing hr and tr:B:   20X  2 x 10 2 x 10 NT NT  NT     Standing slr x 3:B:   20X  2 x 10 2 x 10 2# x 20 NT  20X  2.5#      Standing hamstring curls:R  20X  2 x 10 2 x 10 2# x 20 NT  20X 2.5#      Mini squats to the chair  20X  2 x 10 2 x 10 2 x 10 2 x 10  20X     Lunges on   20X  2 x 10 2 x 10 NT 2 x 10  20X     SLS and tandem stance:B:  30SEC 5X  30 sec x 4 30 sec x 4 NT NT  NT     Side stepping and tandem ambulation  NT On 10 feet of airex 4 x  10 feet airex at ballet bar x 4 NT NT  NT     Forward step ups:R 8\"  NT 6\" x 20 6\" x 20 6\" x 30 8\" x 20 8\" x20 20X 2.5#     Lateral step ups:R 8\"  NT 6\" x 20 6\" x 20 6\" x 30 8\" x 20 8\" x20 20X 2.5#      Step downs:R 8\"  NT NT 4\" NT 6\" x 20 6\" x 20 6\"x20 20X 2.5#                  Cybex leg press     Leg press and resistive  # x 30 of each Leg press and resistive  # x 30 of each Leg press and resistive # x 30 of each    # x20 290# 30X    SL     170#       Cybex Claude Extension       48# x10  36# x10 36# 20X     Cybex Leg Curll        50#    x20 35# 20X     Ther Activity                          Seated florwd flex with ball         20x      Gait Training                                       Modalities             CP to right knee  10 min 10 MIN  10 min 10 min 10 min 10 min 10 min 10 MIN                          "

## 2024-12-10 NOTE — PROGRESS NOTES
"PT Evaluation  / PT Reassessment    Today's date: 12/10/2024  Patient name: João Hylton  : 1965  MRN: [id number]  Referring provider: Kleber Hoffmann MD  Dx:   Encounter Diagnosis     ICD-10-CM    1. History of total knee replacement, right  Z96.651       2. Primary osteoarthritis of right knee  M17.11           Start Time: 1100  Stop Time: 1200  Total time in clinic (min): 60 minutes    Assessment  Impairments: abnormal gait, abnormal or restricted ROM, abnormal movement, activity intolerance, impaired physical strength, lacks appropriate home exercise program and pain with function    Assessment details: PT Reassessment: 12/10/24.  Patient reported the following progress since onset of PT: \"everything\" with specifics of walking, transfers, stairs, driving, return to just several hours of work a day, pain decrease, right lower extremity mobility and strength and self functional progress.  But, he noted the following deficits that still persists: prolonged standing, prolonged walking, unable to resume full days with of work, right knee pain posterior and lateral region, car transfers, stair descent, initial sleep positions, right knee edema and distal posterior lateral right lower extremity \"nerve pain\".      PT IE: 24.  Patient underwent right TKA on 10/29/24.  Patient noted he had home care nursing and PT.  Patient noted he had severe right knee pain and disability from right knee for 13 months.  Patient noted he trial of knee injections which were unsuccessful.  Patient noted he had left TKA in his past.  Patient noted he walks \"different\" due to right knee.  Patient noted prior to surgery he utilized a circumduction type of gait. Patient noted he will return to bowling, with right lower extremity as the push off lower extremity.  Patient noted in 2024 he and severe calf pain.  Patient noted the Doppler which was - for DVT.  Patient noted he then had right sciatica type symptoms due to " gait and mobility changes.  Patient noted he has right knee edema that still persists.  Patient is utilizing kinesio taping for right knee pain changes.  Patient noted since right TKA, night time is pain aggravating.  Patient noted he has 5 of 10 pain at rest.  Patient noted he has right knee edema as the day progresses.  Patient noted he has stairs to leave his house, but he noted he is able to perform in a non reciprocal pattern. Patient noted he is + for right popliteal region baker's cyst.  Patient denies right lower extremity paresthesias.  Patient noted edema is more limiting to mobility than pain.  Patient noted extension was more limited by pain and weakness than flexion.    Understanding of Dx/Px/POC: excellent     Prognosis: good  Prognosis details: Patient is a 59 y.o. year old male seen for outpatient PT reevaluation with pain, mobility and functional deficits due to Primary osteoarthritis of right knee [M17.11] and subsequent right TKA on 10/29/24. Patient presents with the following progress since onset of PT: decrease in right knee pain, increase in right lower extremity mobility and strength, decrease in TTP, decrease in right knee edema, gait and stair progress, transfer progress and self iadls and work activities.  Patient presents to PT reassessment with the following problems, concerns, deficits and impairments: right knee pain, decreased right lower extremity range of motion, decreased right lower extremity mobility and strength, right knee edema, + TTP, gait and stair dysfunctions, transfer dysfunctions, functional limitations and decreased tolerance to activity.  Patient would benefit from skilled PT services under the following PT treatment plan to address the above noted deficits: therapeutic exercises and activities to facilitate right lower extremity mobility and strength, gait and stair training, transfer training, modalities, manual therapy techniques, IASTM techniques, balance and  proprioception activities, kinesio taping techniques and a hep.  Thank you for the referral.     Goals  Short Term goals 4 - 6 weeks  1.  Patient will be independent HEP.  MET.  2.  Patient will report a 25 - 50% decrease in pain complaints.  MET.  3.  Increase strength 1/2 grade.  MET.  4.  Increase ROM 5-10 degrees.  MET.    Long Term goals 8 - 12 weeks  1.  Patient will report elimination of pain complaints.  Partially MET.  2.  Patient will return to all work related activities without restriction.Partially MET.  3.  Patient will return to all recreational activities without restriction.Partially MET.  4.  ROM WFL.Partially MET.  5.  Strength 5/5.Partially MET.  6.  Patient will exhibit all transfers are without right knee symptom aggravation or limitations.Partially MET.  7.  Patient will exhibit gait without assistive device without right knee symptom aggravation or limitations.Partially MET.  8.  Patient will exhibit stair climbing in a reciprocal pattern without right knee symptom aggravation or limitations.Partially MET.  9.  Patient will report ability to resume recreational activities without right knee symptom aggravation or limitations.Partially MET.  10.  Patient will report ability to dress without right knee symptom aggravation or limitations.Partially MET.  11.  Patient will report ability to sleep without deficits.Partially MET.    Plan  Patient would benefit from: skilled physical therapy and PT eval  Planned modality interventions: low level laser therapy, manual electrical stimulation, TENS, thermotherapy: hydrocollator packs, ultrasound, unattended electrical stimulation, cryotherapy and electrical stimulation/Russian stimulation    Planned therapy interventions: IASTM, joint mobilization, kinesiology taping, manual therapy, massage, balance, balance/weight bearing training, neuromuscular re-education, postural training, body mechanics training, self care, compression, strengthening,  stretching, therapeutic activities, therapeutic exercise, therapeutic training, transfer training, flexibility, functional ROM exercises, gait training, graded activity, graded exercise, graded motor, home exercise program and IADL retraining    Frequency: 2-3x week  Duration in weeks: 12  Treatment plan discussed with: patient    Subjective Evaluation    History of Present Illness  Mechanism of injury: Patient's PMHx is remarkable for sleep apnea, Asthma, RA, IBS, hypothyroidism, throat cancer with surgical intervention with in one year follow up July, and insomnia.    History:Total knee arthroplasty .     Technique:2 radiographic views of the right knee .     Comparisons: Comparison is made to prior radiographs performed on 2024 .     Findings:     No acute fracture or dislocation visualized.     Status post right knee arthroplasty. Alignment appears satisfactory. There is   adjacent expected soft tissue swelling and soft tissue air.       Patient Goals  Patient goals for therapy: decreased pain, increased motion, increased strength, independence with ADLs/IADLs, return to sport/leisure activities and improved balance  Patient goal: to get back to boweling  Pain  At best pain ratin  At worst pain ratin  Location: Right Knee        Objective     Tenderness     Additional Tenderness Details  Patient is - TTP at right knee.    Active Range of Motion   Left Hip   Flexion: 100 degrees     Right Hip   Flexion: 92 degrees   Left Knee   Flexion: 114 degrees   Extension: -2 degrees   Extensor la degrees     Right Knee   Flexion: 108 degrees   Extension: -4 degrees   Extensor lag: 10 degrees   Left Ankle/Foot   Dorsiflexion (ke): 12 degrees   Plantar flexion: 50 degrees     Right Ankle/Foot   Dorsiflexion (ke): 10 degrees   Plantar flexion: 44 degrees     Passive Range of Motion     Right Knee   Flexion: 114 degrees with pain  Extension: 0 degrees with pain    Strength/Myotome Testing     Left Hip   Planes  of Motion   Flexion: 5  Extension: 5  Abduction: 5  Adduction: 5    Right Hip   Planes of Motion   Flexion: 4  Extension: 4+  Abduction: 4+  Adduction: 5    Left Knee   Flexion: 5  Extension: 5    Right Knee   Flexion: 4  Extension: 4    Left Ankle/Foot   Dorsiflexion: 5  Plantar flexion: 5    Right Ankle/Foot   Dorsiflexion: 4  Plantar flexion: 5    Ambulation     Ambulation: Level Surfaces   Ambulation with assistive device: independent  Ambulation without assistive device: independent    Additional Level Surfaces Ambulation Details  Patient ambulates without assistive device with decrease in pace and decrease in right stance and left swing phase.    Ambulation: Stairs   Ascend stairs: independent  Pattern: reciprocal  Railings: two rails  Descend stairs: independent  Pattern: reciprocal  Railings: two rails    General Comments:      Knee Comments  PT IE Post Op Girth Measurements:  Knee:  Suprapatellar region: Right at 63.4 CM and left at 54.8 CM;  Mid patellar region: Right at 61.2 CM and left at 52.9 Cm;  Infrapatellar region: Right at 59.6 CM and left at 50.6 Cm.    PT Reassessment 12/10/24 Post Op Girth Measurements:  Knee:  Suprapatellar region: Right at 61.0 CM and left at 54.8 CM;  Mid patellar region: Right at 60.0 CM and left at 52.9 Cm;  Infrapatellar region: Right at 54.7 CM and left at 50.6 Cm.               Precautions: Right TKA on 10/29/24.    Patient's PMHx is remarkable for sleep apnea, Asthma, RA, IBS, hypothyroidism, and insomnia.      Manuals 11/13            Kinesio taping to right superior foot and posterior gastrocnemius region in a basket weave 10 min                         Right knee prom                          Neuro Re-Ed                                                                                                        Ther Ex                          Nu step                          Seated hr and tr:B             Seated heel slides:R             Seated LAQ:R:             Seated  hip flexion:R             Seated hip abduction isometrics:B:             Seated hip adduction isometrics:B:                          Ankle pumps:R             Quad sets:R             Heel slides:R             SAQ:B:             Supine hip abduction                          Prone hip extension:R             Prone knee flexion:R             Prone tke:R                          Standing hr and tr:B:             Standing slr x 3:B:             Standing hamstring curls:R             Mini squats to the chair             Lunges              SLS and tandem stance:B:             Side stepping and tandem ambulation             Forward step ups:R             Lateral step ups:R             Step downs:R                                                    Ther Activity                                       Gait Training                                       Modalities             CP to right knee  10 min

## 2024-12-10 NOTE — LETTER
"December 10, 2024    Kleber Hoffmann MD  [address] [place] [address]  [address]  [facility] [address]    Patient: João Hylton   YOB: 1965   Date of Visit: 12/10/2024     Encounter Diagnosis     ICD-10-CM    1. History of total knee replacement, right  Z96.651       2. Primary osteoarthritis of right knee  M17.11           Dear Dr. Hoffmann:    Thank you for your recent referral of João Hylton. Please review the attached evaluation summary from João's recent visit.     Please verify that you agree with the plan of care by signing the attached order.     If you have any questions or concerns, please do not hesitate to call.     I sincerely appreciate the opportunity to share in the care of one of your patients and hope to have another opportunity to work with you in the near future.       Sincerely,    Wilson Rios, PT      Referring Provider:      I certify that I have read the below Plan of Care and certify the need for these services furnished under this plan of treatment while under my care.                    Kleber Hoffmann MD  [address] [place] [id number]  Via Fax: [phone]          Daily Note     Today's date: 12/10/2024  Patient name: João Hylton  : 1965  MRN: [id number]  Referring provider: Kleber Hoffmann MD  Dx:   Encounter Diagnosis     ICD-10-CM    1. History of total knee replacement, right  Z96.651       2. Primary osteoarthritis of right knee  M17.11                        Subjective:  Pt reported \"if it wasn't for the sciatic pain I would be ok\"      Objective: See treatment diary below      Assessment: Sciati  pain restricts patient from progressing with R knee ROM. Progress as able       Plan: Continue per plan of care.        Precautions: Right TKA on 10/29/24.    Patient's PMHx is remarkable for sleep apnea, Asthma, RA, IBS, hypothyroidism, and insomnia.      Manuals 11/13 11/15 11/18 11/20 11/29 12/2 12/5 12/10     Kinesio taping to right superior foot and " "posterior gastrocnemius region in a basket weave 10 min NT    Ktape to R post knee  Posterior central thigh and lateral hamstring tendon NT Posterior fibular head moving KT tape fwd, anteriorly and distal at 100% tension x 10 min 10 min total  NT                  Right knee prom  NT 10 min with retrograde massage 10 min with retrograde massage 10 min total with patient's spouse taught proper technique 10 min total 5 min NT                  Neuro Re-Ed                                       Ther Ex                          Nu step  12 min  10 min 12 min 12 min 12 min 12 min 12 min                   Seated hr and tr:B  20x  NT NT NT NT  NT     Seated heel slides:R  NT NT NT NT NT  NT     Seated LAQ:R:  20x  2 x 10 2 x 10 2# x 20 NT  NT     Seated hip flexion:R  20x  2 x 10 NT 2# x 20 NT  NT     Seated hip abduction isometrics:B:  NT NT NT NT NT  NT     Seated hip adduction isometrics:B:  NT NT NT NT NT  NT                  Ankle pumps:R  NT NT NT NT NT  NT     Quad sets:R  NT NT NT NT NT  NT     Heel slides:R  20X  2 x 10 2 x 10 NT 2 x 10 2x10 NT     SAQ:B:  20X 3SEC  2 x 10 NT NT NT  NT     Supine hip abduction  NT NT NT NT NT  NT                  Prone hip extension:R  NT NT 2 x 10 NT NT  NT     Prone knee flexion:R  20X  Self prom 20 sec x 5 20 sec x 5 NT 2 x 10  NT     Prone tke:R  20X 3SEC  NT NT NT NT  NT                  Knee flexion stretch on 2 nd step:B:   20 sec x 5 20 sec x 5 20 sec x 5 20 sec x 5 20\"x5 20sec 5x      Knee extension stretch off 1 st stretch:B:    20 sec x 5 20 sec x 5 20 sec x 5 20\"x5 20sec 5x      Standing  hamstring stretch:R      20 sec x 5 20\"x5 20sec 5x                  Standing hr and tr:B:   20X  2 x 10 2 x 10 NT NT  NT     Standing slr x 3:B:   20X  2 x 10 2 x 10 2# x 20 NT  20X  2.5#      Standing hamstring curls:R  20X  2 x 10 2 x 10 2# x 20 NT  20X 2.5#      Mini squats to the chair  20X  2 x 10 2 x 10 2 x 10 2 x 10  20X     Lunges on   20X  2 x 10 2 x 10 NT 2 x 10  20X     SLS " "and tandem stance:B:  30SEC 5X  30 sec x 4 30 sec x 4 NT NT  NT     Side stepping and tandem ambulation  NT On 10 feet of airex 4 x  10 feet airex at ballet bar x 4 NT NT  NT     Forward step ups:R 8\"  NT 6\" x 20 6\" x 20 6\" x 30 8\" x 20 8\" x20 20X 2.5#     Lateral step ups:R 8\"  NT 6\" x 20 6\" x 20 6\" x 30 8\" x 20 8\" x20 20X 2.5#      Step downs:R 8\"  NT NT 4\" NT 6\" x 20 6\" x 20 6\"x20 20X 2.5#                  Cybex leg press     Leg press and resistive  # x 30 of each Leg press and resistive  # x 30 of each Leg press and resistive # x 30 of each    # x20 290# 30X    SL     170#       Cybex Claude Extension       48# x10  36# x10 36# 20X     Cybex Leg Curll        50#    x20 35# 20X     Ther Activity                          Seated florwd flex with ball         20x      Gait Training                                       Modalities             CP to right knee  10 min 10 MIN  10 min 10 min 10 min 10 min 10 min 10 MIN                            Attestation signed by Wilson Rios PT at 12/10/2024 12:47 PM:  I supervised the visit.  We discussed the case to ensure appropriate continuation and progression of care and I reviewed the documentation.     PT Evaluation  / PT Reassessment    Today's date: 12/10/2024  Patient name: João Hylton  : 1965  MRN: [id number]  Referring provider: Kleber Hoffmann MD  Dx:   Encounter Diagnosis     ICD-10-CM    1. History of total knee replacement, right  Z96.651       2. Primary osteoarthritis of right knee  M17.11           Start Time: 1100  Stop Time: 1200  Total time in clinic (min): 60 minutes    Assessment  Impairments: abnormal gait, abnormal or restricted ROM, abnormal movement, activity intolerance, impaired physical strength, lacks appropriate home exercise program and pain with function    Assessment details: PT Reassessment: 12/10/24.  Patient reported the following progress since onset of PT: \"everything\" with specifics of walking, transfers, " "stairs, driving, return to just several hours of work a day, pain decrease, right lower extremity mobility and strength and self functional progress.  But, he noted the following deficits that still persists: prolonged standing, prolonged walking, unable to resume full days with of work, right knee pain posterior and lateral region, car transfers, stair descent, initial sleep positions, right knee edema and distal posterior lateral right lower extremity \"nerve pain\".      PT IE: 11/13/24.  Patient underwent right TKA on 10/29/24.  Patient noted he had home care nursing and PT.  Patient noted he had severe right knee pain and disability from right knee for 13 months.  Patient noted he trial of knee injections which were unsuccessful.  Patient noted he had left TKA in his past.  Patient noted he walks \"different\" due to right knee.  Patient noted prior to surgery he utilized a circumduction type of gait. Patient noted he will return to bowling, with right lower extremity as the push off lower extremity.  Patient noted in February of 2024 he and severe calf pain.  Patient noted the Doppler which was - for DVT.  Patient noted he then had right sciatica type symptoms due to gait and mobility changes.  Patient noted he has right knee edema that still persists.  Patient is utilizing kinesio taping for right knee pain changes.  Patient noted since right TKA, night time is pain aggravating.  Patient noted he has 5 of 10 pain at rest.  Patient noted he has right knee edema as the day progresses.  Patient noted he has stairs to leave his house, but he noted he is able to perform in a non reciprocal pattern. Patient noted he is + for right popliteal region baker's cyst.  Patient denies right lower extremity paresthesias.  Patient noted edema is more limiting to mobility than pain.  Patient noted extension was more limited by pain and weakness than flexion.    Understanding of Dx/Px/POC: excellent     Prognosis: good  Prognosis " details: Patient is a 59 y.o. year old male seen for outpatient PT reevaluation with pain, mobility and functional deficits due to Primary osteoarthritis of right knee [M17.11] and subsequent right TKA on 10/29/24. Patient presents with the following progress since onset of PT: decrease in right knee pain, increase in right lower extremity mobility and strength, decrease in TTP, decrease in right knee edema, gait and stair progress, transfer progress and self iadls and work activities.  Patient presents to PT reassessment with the following problems, concerns, deficits and impairments: right knee pain, decreased right lower extremity range of motion, decreased right lower extremity mobility and strength, right knee edema, + TTP, gait and stair dysfunctions, transfer dysfunctions, functional limitations and decreased tolerance to activity.  Patient would benefit from skilled PT services under the following PT treatment plan to address the above noted deficits: therapeutic exercises and activities to facilitate right lower extremity mobility and strength, gait and stair training, transfer training, modalities, manual therapy techniques, IASTM techniques, balance and proprioception activities, kinesio taping techniques and a hep.  Thank you for the referral.     Goals  Short Term goals 4 - 6 weeks  1.  Patient will be independent HEP.  MET.  2.  Patient will report a 25 - 50% decrease in pain complaints.  MET.  3.  Increase strength 1/2 grade.  MET.  4.  Increase ROM 5-10 degrees.  MET.    Long Term goals 8 - 12 weeks  1.  Patient will report elimination of pain complaints.  Partially MET.  2.  Patient will return to all work related activities without restriction.Partially MET.  3.  Patient will return to all recreational activities without restriction.Partially MET.  4.  ROM WFL.Partially MET.  5.  Strength 5/5.Partially MET.  6.  Patient will exhibit all transfers are without right knee symptom aggravation or  limitations.Partially MET.  7.  Patient will exhibit gait without assistive device without right knee symptom aggravation or limitations.Partially MET.  8.  Patient will exhibit stair climbing in a reciprocal pattern without right knee symptom aggravation or limitations.Partially MET.  9.  Patient will report ability to resume recreational activities without right knee symptom aggravation or limitations.Partially MET.  10.  Patient will report ability to dress without right knee symptom aggravation or limitations.Partially MET.  11.  Patient will report ability to sleep without deficits.Partially MET.    Plan  Patient would benefit from: skilled physical therapy and PT eval  Planned modality interventions: low level laser therapy, manual electrical stimulation, TENS, thermotherapy: hydrocollator packs, ultrasound, unattended electrical stimulation, cryotherapy and electrical stimulation/Russian stimulation    Planned therapy interventions: IASTM, joint mobilization, kinesiology taping, manual therapy, massage, balance, balance/weight bearing training, neuromuscular re-education, postural training, body mechanics training, self care, compression, strengthening, stretching, therapeutic activities, therapeutic exercise, therapeutic training, transfer training, flexibility, functional ROM exercises, gait training, graded activity, graded exercise, graded motor, home exercise program and IADL retraining    Frequency: 2-3x week  Duration in weeks: 12  Treatment plan discussed with: patient    Subjective Evaluation    History of Present Illness  Mechanism of injury: Patient's PMHx is remarkable for sleep apnea, Asthma, RA, IBS, hypothyroidism, throat cancer with surgical intervention with in one year follow up July, and insomnia.    History:Total knee arthroplasty .     Technique:2 radiographic views of the right knee .     Comparisons: Comparison is made to prior radiographs performed on 8/12/2024 .     Findings:     No  acute fracture or dislocation visualized.     Status post right knee arthroplasty. Alignment appears satisfactory. There is   adjacent expected soft tissue swelling and soft tissue air.       Patient Goals  Patient goals for therapy: decreased pain, increased motion, increased strength, independence with ADLs/IADLs, return to sport/leisure activities and improved balance  Patient goal: to get back to boweling  Pain  At best pain ratin  At worst pain ratin  Location: Right Knee        Objective     Tenderness     Additional Tenderness Details  Patient is - TTP at right knee.    Active Range of Motion   Left Hip   Flexion: 100 degrees     Right Hip   Flexion: 92 degrees   Left Knee   Flexion: 114 degrees   Extension: -2 degrees   Extensor la degrees     Right Knee   Flexion: 108 degrees   Extension: -4 degrees   Extensor lag: 10 degrees   Left Ankle/Foot   Dorsiflexion (ke): 12 degrees   Plantar flexion: 50 degrees     Right Ankle/Foot   Dorsiflexion (ke): 10 degrees   Plantar flexion: 44 degrees     Passive Range of Motion     Right Knee   Flexion: 114 degrees with pain  Extension: 0 degrees with pain    Strength/Myotome Testing     Left Hip   Planes of Motion   Flexion: 5  Extension: 5  Abduction: 5  Adduction: 5    Right Hip   Planes of Motion   Flexion: 4  Extension: 4+  Abduction: 4+  Adduction: 5    Left Knee   Flexion: 5  Extension: 5    Right Knee   Flexion: 4  Extension: 4    Left Ankle/Foot   Dorsiflexion: 5  Plantar flexion: 5    Right Ankle/Foot   Dorsiflexion: 4  Plantar flexion: 5    Ambulation     Ambulation: Level Surfaces   Ambulation with assistive device: independent  Ambulation without assistive device: independent    Additional Level Surfaces Ambulation Details  Patient ambulates without assistive device with decrease in pace and decrease in right stance and left swing phase.    Ambulation: Stairs   Ascend stairs: independent  Pattern: reciprocal  Railings: two rails  Descend stairs:  independent  Pattern: reciprocal  Railings: two rails    General Comments:      Knee Comments  PT IE Post Op Girth Measurements:  Knee:  Suprapatellar region: Right at 63.4 CM and left at 54.8 CM;  Mid patellar region: Right at 61.2 CM and left at 52.9 Cm;  Infrapatellar region: Right at 59.6 CM and left at 50.6 Cm.    PT Reassessment 12/10/24 Post Op Girth Measurements:  Knee:  Suprapatellar region: Right at 61.0 CM and left at 54.8 CM;  Mid patellar region: Right at 60.0 CM and left at 52.9 Cm;  Infrapatellar region: Right at 54.7 CM and left at 50.6 Cm.               Precautions: Right TKA on 10/29/24.    Patient's PMHx is remarkable for sleep apnea, Asthma, RA, IBS, hypothyroidism, and insomnia.      Manuals 11/13            Kinesio taping to right superior foot and posterior gastrocnemius region in a basket weave 10 min                         Right knee prom                          Neuro Re-Ed                                                                                                        Ther Ex                          Nu step                          Seated hr and tr:B             Seated heel slides:R             Seated LAQ:R:             Seated hip flexion:R             Seated hip abduction isometrics:B:             Seated hip adduction isometrics:B:                          Ankle pumps:R             Quad sets:R             Heel slides:R             SAQ:B:             Supine hip abduction                          Prone hip extension:R             Prone knee flexion:R             Prone tke:R                          Standing hr and tr:B:             Standing slr x 3:B:             Standing hamstring curls:R             Mini squats to the chair             Lunges              SLS and tandem stance:B:             Side stepping and tandem ambulation             Forward step ups:R             Lateral step ups:R             Step downs:R                                                    Ther Activity                                        Gait Training                                       Modalities             CP to right knee  10 min

## 2024-12-17 ENCOUNTER — OFFICE VISIT (OUTPATIENT)
Dept: PHYSICAL THERAPY | Age: 59
End: 2024-12-17
Payer: COMMERCIAL

## 2024-12-17 DIAGNOSIS — Z96.651 HISTORY OF TOTAL KNEE REPLACEMENT, RIGHT: Primary | ICD-10-CM

## 2024-12-17 DIAGNOSIS — M17.11 PRIMARY OSTEOARTHRITIS OF RIGHT KNEE: ICD-10-CM

## 2024-12-17 PROCEDURE — 97110 THERAPEUTIC EXERCISES: CPT | Performed by: PHYSICAL THERAPIST

## 2024-12-17 PROCEDURE — 97112 NEUROMUSCULAR REEDUCATION: CPT | Performed by: PHYSICAL THERAPIST

## 2024-12-17 NOTE — PROGRESS NOTES
Daily Note     Today's date: 2024  Patient name: João Hylton  : 1965  MRN: [id number]  Referring provider: Kleber Hoffmann MD  Dx:   Encounter Diagnosis     ICD-10-CM    1. History of total knee replacement, right  Z96.651       2. Primary osteoarthritis of right knee  M17.11                        Subjective:  Patient noted he saw his surgeon who noted his right TKA is doing very well and he feels that right lower extremity symptoms are from the lumbar spine region.  Patient noted his surgeon recommends patient to speak with RA doctor to discuss Gabapentin medication increase.  Patient noted his surgeon wants him to follow up with his chiropractor as well.  Patient  noted he cancelled last PT visit due to falling as sleep in the recliner in an odd position and fell back getting out of the recliner which created back and right lower extremity.  Patient noted driving continues to aggravate right lower extremity pain.  Patient noted he has returned to work activities, but not full duties.  Patient noted he was in right side lying and then stretched his right lower extremity which eliminated his pain for 20 hours.  Patient noted his pain has returned into the right lower extremity.  Patient noted sitting on the edge of a seat is pain aggravating to right distal hamstring region.      Objective: See treatment diary below      Assessment: Patient exhibits repeated lumbar spine extension in standing aggravating right distal lower extremity pain.  Patient presents with right side glide promoting centralization of right distal lower extremity pain from right distal lower extremity into the distal hamstring region.  Patient provided with new hep of right side glides for hep to address right lower extremity radicular symptom presentation.  Plus, therapeutic exercises to continue to promote right lower extremity strengthening, knee mobility to promote full functional return to self, house hold and work activities  as well as return to his recreational activity of bowling.  Patient provided with new hep of prone press ups and right side glides against wall and we will determine effectiveness of hep on right distal lower extremity symptom presentation on next PT visit.      Plan: Continue per plan of care.        Precautions: Right TKA on 10/29/24.    Patient's PMHx is remarkable for sleep apnea, Asthma, RA, IBS, hypothyroidism, and insomnia.    Access Code: [email or web address]  URL: [email or web address]  Date: 12/17/2024  Prepared by: Wilson Rios    Exercises  - Left Standing Lateral Shift Correction at Wall - Hold  - 4-5 x daily - 7 x weekly - 2-3 sets - 10 reps  - Prone Press Up  - 4-5 x daily - 7 x weekly - 2 sets - 10 reps    Manuals 11/13 11/15 11/18 11/20 11/29 12/2 12/5 12/10 12/17    Kinesio taping to right superior foot and posterior gastrocnemius region in a basket weave 10 min NT    Ktape to R post knee  Posterior central thigh and lateral hamstring tendon NT Posterior fibular head moving KT tape fwd, anteriorly and distal at 100% tension x 10 min 10 min total  NT NT                 Right knee prom  NT 10 min with retrograde massage 10 min with retrograde massage 10 min total with patient's spouse taught proper technique 10 min total 5 min NT NT                 Neuro Re-Ed                                       Ther Ex                          Nu step  12 min  10 min 12 min 12 min 12 min 12 min 12 min  12 min                 Seated hr and tr:B  20x  NT NT NT NT  NT NT    Seated heel slides:R  NT NT NT NT NT  NT NT    Seated LAQ:R:  20x  2 x 10 2 x 10 2# x 20 NT  NT 7.5# x 50    Seated hip flexion:R  20x  2 x 10 NT 2# x 20 NT  NT 7.5# x 50    Seated hip abduction isometrics:B:  NT NT NT NT NT  NT NT    Seated hip adduction isometrics:B:  NT NT NT NT NT  NT NT                 Ankle pumps:R  NT NT NT NT NT  NT NT    Quad sets:R  NT NT NT NT NT  NT NT    Heel slides:R  20X  2 x 10 2 x 10 NT 2 x 10 2x10 NT 2 x 10   "  SAQ:B:  20X 3SEC  2 x 10 NT NT NT  NT NT    Supine hip abduction  NT NT NT NT NT  NT NT                 Prone hip extension:R  NT NT 2 x 10 NT NT  NT NT    Prone knee flexion:R  20X  Self prom 20 sec x 5 20 sec x 5 NT 2 x 10  NT Stretch 20 sec x 5    Prone tke:R  20X 3SEC  NT NT NT NT  NT NT    Prone lying         2 min    Prone press ups         2 x 10 hep    Prone press ups with lock and sag         10 x hep                 Knee flexion stretch on 2 nd step:B:   20 sec x 5 20 sec x 5 20 sec x 5 20 sec x 5 20\"x5 20sec 5x  NT    Knee extension stretch off 1 st stretch:B:    20 sec x 5 20 sec x 5 20 sec x 5 20\"x5 20sec 5x  NT    Standing  hamstring stretch:R      20 sec x 5 20\"x5 20sec 5x NT                 Standing lumbar spine extension         2 x 10 peripheral symptoms into right ankle region    Right side glides against wall         3 x 10 hep                 Standing hr and tr:B:   20X  2 x 10 2 x 10 NT NT  NT NT    Standing slr x 3:B:   20X  2 x 10 2 x 10 2# x 20 NT  20X  2.5#  NT    Standing hamstring curls:R  20X  2 x 10 2 x 10 2# x 20 NT  20X 2.5#  NT    Mini squats to the chair  20X  2 x 10 2 x 10 2 x 10 2 x 10  20X 3 x 10    Lunges on   20X  2 x 10 2 x 10 NT 2 x 10  20X 3 x 10    SLS and tandem stance:B:  30SEC 5X  30 sec x 4 30 sec x 4 NT NT  NT NT    Side stepping and tandem ambulation  NT On 10 feet of airex 4 x  10 feet airex at ballet bar x 4 NT NT  NT NT    Forward step ups:R 8\"  NT 6\" x 20 6\" x 20 6\" x 30 8\" x 20 8\" x20 20X 2.5# 2 x 10    Lateral step ups:R 8\"  NT 6\" x 20 6\" x 20 6\" x 30 8\" x 20 8\" x20 20X 2.5#  2 x 10    Step downs:R 8\"  NT NT 4\" NT 6\" x 20 6\" x 20 6\"x20 20X 2.5# NT                 Cybex leg press     Leg press and resistive  # x 30 of each Leg press and resistive  # x 30 of each Leg press and resistive # x 30 of each    # x20 290# 30X    SL     170#   NT    Cybex Claude Extension       48# x10  36# x10 36# 20X NT    Cybex Leg Curll        50#    x20 35# 20X " NT    Ther Activity                          Seated florwd flex with ball         20x  NT    Gait Training                                       Modalities             CP to right knee  10 min 10 MIN  10 min 10 min 10 min 10 min 10 min 10 MIN  10 min

## 2024-12-19 ENCOUNTER — OFFICE VISIT (OUTPATIENT)
Dept: PHYSICAL THERAPY | Age: 59
End: 2024-12-19
Payer: COMMERCIAL

## 2024-12-19 DIAGNOSIS — Z96.651 HISTORY OF TOTAL KNEE REPLACEMENT, RIGHT: Primary | ICD-10-CM

## 2024-12-19 DIAGNOSIS — M17.11 PRIMARY OSTEOARTHRITIS OF RIGHT KNEE: ICD-10-CM

## 2024-12-19 PROCEDURE — 97112 NEUROMUSCULAR REEDUCATION: CPT | Performed by: PHYSICAL THERAPIST

## 2024-12-19 PROCEDURE — 97110 THERAPEUTIC EXERCISES: CPT | Performed by: PHYSICAL THERAPIST

## 2024-12-19 PROCEDURE — 97140 MANUAL THERAPY 1/> REGIONS: CPT | Performed by: PHYSICAL THERAPIST

## 2024-12-19 NOTE — PROGRESS NOTES
"Daily Note     Today's date: 2024  Patient name: João Hylton  : 1965  MRN: [id number]  Referring provider: Kleber Hoffmann MD  Dx:   Encounter Diagnosis     ICD-10-CM    1. History of total knee replacement, right  Z96.651       2. Primary osteoarthritis of right knee  M17.11                        Subjective:  Patient noted side glides are pain aggravating.  Patient noted right lower sciatica symptoms are less, with the top of his calf \"locked up\".  Patient noted getting in and out of the car is still challenging.  Patient noted driving is better.  Patient noted he is able to perform 2 hours of work prior to right lower extremity pain aggravation.  Patient noted he did 8000 step two days ago and 30466 steps at work yesterday.  Patient noted sitting on the edge of a seat is pain aggravating to right distal hamstring region.  Patient noted right posterior lower extremity in the gastrocnemius and hamstring region pain.  Patient noted he has right knee ache.  Patient noted he continues to utilize taping of right knee for pain reduction and edema reduction.      Objective: See treatment diary below      Assessment: Patient presents with manual therapy techniques of lumbar spine extension mobilization, inferior sacral glide and sacral rocking resolving right lumbar spine and PSIS region \"pinch type\" symptoms which promoted full lumbar spine extension in prone and pain elimination in right lower extremity.  Patient exhibits lack of long term right distal lower extremity symptom reduction with standing based functional activities and limits his ability to resume recreational activities.  But, he continues to lack right posterior lower extremity symptom resolution and functional deficits.  Patient provided with new hep of prone press ups and lumbar spine standing extension over pressure f/b right side glides against wall as his current lumbar spine and right lower extremity symptom reduction / centralization " hep and we will continue to determine effectiveness of hep on right distal lower extremity symptom presentation on next PT visit.      Plan: Continue per plan of care.        Precautions: Right TKA on 10/29/24.    Patient's PMHx is remarkable for sleep apnea, Asthma, RA, IBS, hypothyroidism, and insomnia.    Access Code: [email or web address]  URL: [email or web address]  Date: 12/17/2024  Prepared by: Wilson Rios    Exercises  - Left Standing Lateral Shift Correction at Wall - Hold  - 4-5 x daily - 7 x weekly - 2-3 sets - 10 reps  - Prone Press Up  - 4-5 x daily - 7 x weekly - 2 sets - 10 reps    Manuals 11/29 12/2 12/5 12/10 12/17 12/19     Kinesio taping to right superior foot and posterior gastrocnemius region in a basket weave Posterior fibular head moving KT tape fwd, anteriorly and distal at 100% tension x 10 min 10 min total  NT NT NT                Right knee prom 10 min total with patient's spouse taught proper technique 10 min total 5 min NT NT NT     Prone lumbar spine extension mobilization, inferior sacral glide and sacral rocking      10 min                           Neuro Re-Ed                                 Ther Ex                      Nu step 12 min 12 min 12 min 12 min  12 min 12 min                Seated hr and tr:B NT NT  NT NT NT     Seated heel slides:R NT NT  NT NT NT     Seated LAQ:R: 2# x 20 NT  NT 7.5# x 50 NT     Seated hip flexion:R 2# x 20 NT  NT 7.5# x 50 NT     Seated hip abduction isometrics:B: NT NT  NT NT NT     Seated hip adduction isometrics:B: NT NT  NT NT NT                Ankle pumps:R NT NT  NT NT NT     Quad sets:R NT NT  NT NT NT     Heel slides:R NT 2 x 10 2x10 NT 2 x 10 NT     SAQ:B: NT NT  NT NT NT     Supine hip abduction NT NT  NT NT NT                Prone hip extension:R NT NT  NT NT NT     Prone knee flexion:R NT 2 x 10  NT Stretch 20 sec x 5 3 x 10 arom     Prone tke:R NT NT  NT NT NT     Prone lying     2 min 2 min     Prone press ups     2 x 10 hep 2 x 10    "  Prone press ups with lock and sag     10 x hep 10 x                 Knee flexion stretch on 2 nd step:B: 20 sec x 5 20 sec x 5 20\"x5 20sec 5x  NT 20 sec x 5     Knee extension stretch off 1 st stretch:B: 20 sec x 5 20 sec x 5 20\"x5 20sec 5x  NT 20 sec x 5     Standing  hamstring stretch:R  20 sec x 5 20\"x5 20sec 5x NT NT                Standing self strap over pressure into extension at lower lumbar spine       3 x 10     Standing lumbar spine extension     2 x 10 peripheral symptoms into right ankle region Held today reassess in the future once right distal posterior lower extremity symptoms are centralized     Right side glides against wall     3 x 10 hep 3 x 10                Standing hr and tr:B: NT NT  NT NT NT     Standing slr x 3:B: 2# x 20 NT  20X  2.5#  NT NT     Standing hamstring curls:R 2# x 20 NT  20X 2.5#  NT NT     Mini squats to the chair 2 x 10 2 x 10  20X 3 x 10 3 x 10     Lunges on  NT 2 x 10  20X 3 x 10 3 x 10     SLS and tandem stance:B: NT NT  NT NT NT     Side stepping and tandem ambulation NT NT  NT NT NT     Forward step ups:R 8\" 6\" x 30 8\" x 20 8\" x20 20X 2.5# 2 x 10 2 x 10 on blue foam     Lateral step ups:R 8\" 6\" x 30 8\" x 20 8\" x20 20X 2.5#  2 x 10 2 x 10     Step downs:R 8\" 6\" x 20 6\" x 20 6\"x20 20X 2.5# NT NT                Cybex leg press Leg press and resistive  # x 30 of each Leg press and resistive  # x 30 of each Leg press and resistive # x 30 of each    # x20 290# 30X    SL     170#   NT L x 10 290#, R 170 x 10 and 190 x 10     Cybex Claude Extension   48# x10  36# x10 36# 20X NT NT     Cybex Leg Curll    50#    x20 35# 20X NT NT     Ther Activity                      Seated florwd flex with ball     20x  NT NT     Gait Training                                 Modalities           CP to right knee  10 min 10 min 10 min 10 MIN  10 min 10 min                       "

## 2024-12-23 ENCOUNTER — OFFICE VISIT (OUTPATIENT)
Dept: PHYSICAL THERAPY | Age: 59
End: 2024-12-23
Payer: COMMERCIAL

## 2024-12-23 DIAGNOSIS — M17.11 PRIMARY OSTEOARTHRITIS OF RIGHT KNEE: ICD-10-CM

## 2024-12-23 DIAGNOSIS — Z96.651 HISTORY OF TOTAL KNEE REPLACEMENT, RIGHT: Primary | ICD-10-CM

## 2024-12-23 PROCEDURE — 97140 MANUAL THERAPY 1/> REGIONS: CPT | Performed by: PHYSICAL THERAPIST

## 2024-12-23 PROCEDURE — 97110 THERAPEUTIC EXERCISES: CPT | Performed by: PHYSICAL THERAPIST

## 2024-12-23 NOTE — PROGRESS NOTES
Daily Note     Today's date: 2024  Patient name: João Hylton  : 1965  MRN: [id number]  Referring provider: Kleber Hoffmann MD  Dx:   Encounter Diagnosis     ICD-10-CM    1. History of total knee replacement, right  Z96.651       2. Primary osteoarthritis of right knee  M17.11                        Subjective:  Patient noted right sciatica symptoms are continuing to resolve.  Patient noted he is able to work 2.5 hours a day.  Patient noted he is now able to perform the stairs in a reciprocal pattern.  Patient noted getting in and out of the car is only limited by minimal right posterior knee and calf region.  Patient reported he is able to put his own sock and shoe on his right shoe, but it is limited by tightness, not pain.  Plus, prior to this past PT visit, he required his wife to put on his sock and shoe on his right foot.  Patient noted he is unable to resume his home stationary bike.      Objective: See treatment diary below      Assessment: Patient presents with use of eccentric strengthening, knee flexion mobility and repeated lumbar spine extension with self OP and right side glides as providing short term pain reduction, functional and work activities.  Patient presents with self iadls like donning and doffing his right shoe, stair descent and unable to resume full work activities.  Thus, PT is warranted to facilitate right lower extremity mobility, strength, right lower extremity and lumbar spine pain reduction to promote full functional progress to self, house hold and work activities.       Plan: Continue per plan of care.        Precautions: Right TKA on 10/29/24.    Patient's PMHx is remarkable for sleep apnea, Asthma, RA, IBS, hypothyroidism, and insomnia.    Access Code: [email or web address]  URL: [email or web address]  Date: 2024  Prepared by: Wilson Rios    Exercises  - Left Standing Lateral Shift Correction at Wall - Hold  - 4-5 x daily - 7 x weekly - 2-3 sets - 10 reps  -  "Prone Press Up  - 4-5 x daily - 7 x weekly - 2 sets - 10 reps    Manuals 11/29 12/2 12/5 12/10 12/17 12/19 12/23    Kinesio taping to right superior foot and posterior gastrocnemius region in a basket weave Posterior fibular head moving KT tape fwd, anteriorly and distal at 100% tension x 10 min 10 min total  NT NT NT 10 min along with right ITB with an \"I\" strip and an additional \"I\" strip on the lateral aspect of right popliteal region and moving downward and inward with all \"I\" strips at 50-75% tension and basket weave at paper tape tension.               Right knee prom 10 min total with patient's spouse taught proper technique 10 min total 5 min NT NT NT     Prone lumbar spine extension mobilization, inferior sacral glide and sacral rocking      10 min                           Neuro Re-Ed                                 Ther Ex                      Nu step 12 min 12 min 12 min 12 min  12 min 12 min 10 min recumbent bike seat 22               Seated hr and tr:B NT NT  NT NT NT NT    Seated heel slides:R NT NT  NT NT NT NT    Seated LAQ:R: 2# x 20 NT  NT 7.5# x 50 NT 15# x 50    Seated hip flexion:R 2# x 20 NT  NT 7.5# x 50 NT NT    Seated hip abduction isometrics:B: NT NT  NT NT NT NT    Seated hip adduction isometrics:B: NT NT  NT NT NT NT               Ankle pumps:R NT NT  NT NT NT NT    Quad sets:R NT NT  NT NT NT NT    Heel slides:R NT 2 x 10 2x10 NT 2 x 10 NT NT    SAQ:B: NT NT  NT NT NT NT    Supine hip abduction NT NT  NT NT NT NT               Prone hip extension:R NT NT  NT NT NT NT    Prone knee flexion:R NT 2 x 10  NT Stretch 20 sec x 5 3 x 10 arom Prone knee flexion self stretch:R: 20 sec x 5    Prone tke:R NT NT  NT NT NT NT    Prone lying     2 min 2 min NT    Prone press ups     2 x 10 hep 2 x 10 NT    Prone press ups with lock and sag     10 x hep 10 x  NT               Knee flexion stretch on 2 nd step:B: 20 sec x 5 20 sec x 5 20\"x5 20sec 5x  NT 20 sec x 5 20 sec x 5    Knee extension stretch " "off 1 st stretch:B: 20 sec x 5 20 sec x 5 20\"x5 20sec 5x  NT 20 sec x 5 20 sec x 5    Standing  hamstring stretch:R  20 sec x 5 20\"x5 20sec 5x NT NT NT               Standing self strap over pressure into extension at lower lumbar spine       3 x 10 3 x 10    Standing lumbar spine extension     2 x 10 peripheral symptoms into right ankle region Held today reassess in the future once right distal posterior lower extremity symptoms are centralized NT    Right side glides against wall     3 x 10 hep 3 x 10 3 x 10               Standing hr and tr:B: NT NT  NT NT NT NT    Standing slr x 3:B: 2# x 20 NT  20X  2.5#  NT NT NT    Standing hamstring curls:R 2# x 20 NT  20X 2.5#  NT NT NT    Mini squats to the chair 2 x 10 2 x 10  20X 3 x 10 3 x 10 3 x 10    Lunges on  NT 2 x 10  20X 3 x 10 3 x 10 3 x 10    SLS and tandem stance:B: NT NT  NT NT NT NT    Side stepping and tandem ambulation NT NT  NT NT NT NT    Forward step ups:R 8\" 6\" x 30 8\" x 20 8\" x20 20X 2.5# 2 x 10 2 x 10 on blue foam 8\" x 30 with oval blue     Lateral step ups:R 8\" 6\" x 30 8\" x 20 8\" x20 20X 2.5#  2 x 10 2 x 10 8\" x 30 with oval blue     Step downs:R 8\" 6\" x 20 6\" x 20 6\"x20 20X 2.5# NT NT 8\" x 30 with oval blue                Cybex leg press Leg press and resistive  # x 30 of each Leg press and resistive  # x 30 of each Leg press and resistive # x 30 of each    # x20 290# 30X    SL     170#   NT L x 10 290#, R 170 x 10 and 190 x 10 330# x 40 Bilateral and PF and unilateral leg press 330# L 330# x 20 and R 240# x 20    Cybex Claude Extension   48# x10  36# x10 36# 20X NT NT NT    Cybex Leg Curll    50#    x20 35# 20X NT NT NT    Ther Activity                      Seated florwd flex with ball     20x  NT NT NT    Gait Training                                 Modalities           CP to right knee  10 min 10 min 10 min 10 MIN  10 min 10 min 10 min seated with CP to posterior thigh and knee                      "

## 2024-12-26 ENCOUNTER — OFFICE VISIT (OUTPATIENT)
Dept: PHYSICAL THERAPY | Age: 59
End: 2024-12-26
Payer: COMMERCIAL

## 2024-12-26 DIAGNOSIS — M17.11 PRIMARY OSTEOARTHRITIS OF RIGHT KNEE: ICD-10-CM

## 2024-12-26 DIAGNOSIS — Z96.651 HISTORY OF TOTAL KNEE REPLACEMENT, RIGHT: Primary | ICD-10-CM

## 2024-12-26 PROCEDURE — 97110 THERAPEUTIC EXERCISES: CPT | Performed by: PHYSICAL THERAPIST

## 2024-12-26 PROCEDURE — 97112 NEUROMUSCULAR REEDUCATION: CPT | Performed by: PHYSICAL THERAPIST

## 2024-12-26 NOTE — PROGRESS NOTES
"Daily Note     Today's date: 2024  Patient name: João Hylton  : 1965  MRN: [id number]  Referring provider: Kleber Hoffmann MD  Dx:   Encounter Diagnosis     ICD-10-CM    1. History of total knee replacement, right  Z96.651       2. Primary osteoarthritis of right knee  M17.11                        Subjective:  Patient noted he continues to have right gastrocnemius region tightness persists.  Patient noted use of lumbar spine extension and right side glides is reducing his right posterior lower extremity pain.  Patient noted donning and doffing his right shoe and sock on remains limited.  Patient noted right knee edema is decreased.  Patient noted he is now able to perform the stairs in a reciprocal pattern but he continues to perform stairs in a non reciprocal pattern due to safety concerns.  Patient noted getting in and out of the car continues to improve with only limited by minimal right posterior knee and calf region.        Objective: See treatment diary below      Assessment: Patient presents with repeated lumbar spine extension and right side glides creating short term right posterior lower extremity pain reduction and mobility progress.  Plus, patient presents with right knee flexion and extension stretching creating short term mobility progress.  Patient reported he feels a \"binding type right knee sensation at the end of the day that still persists thus patient was provided with education on use of home bike, knee flexion stretch off the step, right side glides and lumbar spine extension against the counter top to address end of the day symptoms since these providing the most right knee mobility and centralized right knee / lumbar spine symptom reduction presentation.  Thus, PT is warranted to facilitate right lower extremity mobility, strength, right lower extremity and lumbar spine pain reduction to promote full functional progress to self, house hold and work activities.  Patient's right " "knee edema measurements as follows with suprapatellar region at 60.1 cm; midpatellar region at 58.9 cm and infrapatellar region at 52.8 cm Christin Germain PTA, assisted in 1:1 PT treatment from 11 to 1125 am.      Plan: Continue per plan of care.        Precautions: Right TKA on 10/29/24.    Patient's PMHx is remarkable for sleep apnea, Asthma, RA, IBS, hypothyroidism, and insomnia.    Access Code: [email or web address]  URL: [email or web address]  Date: 12/17/2024  Prepared by: Wilson Rios    Exercises  - Left Standing Lateral Shift Correction at Wall - Hold  - 4-5 x daily - 7 x weekly - 2-3 sets - 10 reps  - Prone Press Up  - 4-5 x daily - 7 x weekly - 2 sets - 10 reps    Manuals 11/29 12/2 12/5 12/10 12/17 12/19 12/23 12/26   Kinesio taping to right superior foot and posterior gastrocnemius region in a basket weave Posterior fibular head moving KT tape fwd, anteriorly and distal at 100% tension x 10 min 10 min total  NT NT NT 10 min along with right ITB with an \"I\" strip and an additional \"I\" strip on the lateral aspect of right popliteal region and moving downward and inward with all \"I\" strips at 50-75% tension and basket weave at paper tape tension.               Right knee prom 10 min total with patient's spouse taught proper technique 10 min total 5 min NT NT NT     Prone lumbar spine extension mobilization, inferior sacral glide and sacral rocking      10 min                           Neuro Re-Ed                                 Ther Ex                      Nu step 12 min 12 min 12 min 12 min  12 min 12 min 10 min recumbent bike seat 22 10 min recumbent bike seat 17 @ 5 min and upright bike seat 14 at 5 min               Seated hr and tr:B NT NT  NT NT NT NT NT   Seated heel slides:R NT NT  NT NT NT NT NT   Seated LAQ:R: 2# x 20 NT  NT 7.5# x 50 NT 15# x 50    Seated hip flexion:R 2# x 20 NT  NT 7.5# x 50 NT NT    Seated hip abduction isometrics:B: NT NT  NT NT NT NT    Seated hip adduction " "isometrics:B: NT NT  NT NT NT NT               Ankle pumps:R NT NT  NT NT NT NT NT   Quad sets:R NT NT  NT NT NT NT NT   Heel slides:R NT 2 x 10 2x10 NT 2 x 10 NT NT NT   SAQ:B: NT NT  NT NT NT NT NT   Supine hip abduction NT NT  NT NT NT NT NT              Prone hip extension:R NT NT  NT NT NT NT NT   Prone knee flexion:R NT 2 x 10  NT Stretch 20 sec x 5 3 x 10 arom Prone knee flexion self stretch:R: 20 sec x 5    Prone tke:R NT NT  NT NT NT NT    Prone lying     2 min 2 min NT    Prone press ups     2 x 10 hep 2 x 10 NT    Prone press ups with lock and sag     10 x hep 10 x  NT               Knee flexion stretch on 2 nd step:B: 20 sec x 5 20 sec x 5 20\"x5 20sec 5x  NT 20 sec x 5 20 sec x 5 20 sec x 5   Knee extension stretch off 1 st stretch:B: 20 sec x 5 20 sec x 5 20\"x5 20sec 5x  NT 20 sec x 5 20 sec x 5 20 sec x 5   Standing  hamstring stretch:R  20 sec x 5 20\"x5 20sec 5x NT NT NT NT              Standing self strap over pressure into extension at lower lumbar spine       3 x 10 3 x 10 10 x no effect   Standing lumbar spine extension     2 x 10 peripheral symptoms into right ankle region Held today reassess in the future once right distal posterior lower extremity symptoms are centralized NT 2 x 10   Right side glides against wall     3 x 10 hep 3 x 10 3 x 10 2 x 10              Standing hr and tr:B: NT NT  NT NT NT NT NT   Standing slr x 3:B: 2# x 20 NT  20X  2.5#  NT NT NT NT   Standing hamstring curls:R 2# x 20 NT  20X 2.5#  NT NT NT NT   Mini squats to the chair 2 x 10 2 x 10  20X 3 x 10 3 x 10 3 x 10    Lunges on  NT 2 x 10  20X 3 x 10 3 x 10 3 x 10    SLS and tandem stance:B: NT NT  NT NT NT NT    Side stepping and tandem ambulation NT NT  NT NT NT NT    Forward step ups:R 8\" 6\" x 30 8\" x 20 8\" x20 20X 2.5# 2 x 10 2 x 10 on blue foam 8\" x 30 with oval blue     Lateral step ups:R 8\" 6\" x 30 8\" x 20 8\" x20 20X 2.5#  2 x 10 2 x 10 8\" x 30 with oval blue     Step downs:R 8\" 6\" x 20 6\" x 20 6\"x20 20X 2.5# NT " "NT 8\" x 30 with oval blue                Cybex leg press Leg press and resistive  # x 30 of each Leg press and resistive  # x 30 of each Leg press and resistive # x 30 of each    # x20 290# 30X    SL     170#   NT L x 10 290#, R 170 x 10 and 190 x 10 330# x 40 Bilateral and PF and unilateral leg press 330# L 330# x 20 and R 240# x 20    Cybex Claude Extension   48# x10  36# x10 36# 20X NT NT NT    Cybex Leg Curll    50#    x20 35# 20X NT NT NT    Ther Activity                      Seated florwd flex with ball     20x  NT NT NT    Gait Training                                 Modalities           CP to right knee  10 min 10 min 10 min 10 MIN  10 min 10 min 10 min seated with CP to posterior thigh and knee                      "

## 2025-01-03 ENCOUNTER — OFFICE VISIT (OUTPATIENT)
Dept: PHYSICAL THERAPY | Age: 60
End: 2025-01-03
Payer: COMMERCIAL

## 2025-01-03 DIAGNOSIS — Z96.651 HISTORY OF TOTAL KNEE REPLACEMENT, RIGHT: Primary | ICD-10-CM

## 2025-01-03 DIAGNOSIS — M17.11 PRIMARY OSTEOARTHRITIS OF RIGHT KNEE: ICD-10-CM

## 2025-01-03 PROCEDURE — 97140 MANUAL THERAPY 1/> REGIONS: CPT

## 2025-01-03 PROCEDURE — 97110 THERAPEUTIC EXERCISES: CPT

## 2025-01-03 NOTE — PROGRESS NOTES
"Daily Note     Today's date: 1/3/2025  Patient name: João Hylton  : 1965  MRN: [id number]  Referring provider: Kleber Hoffmann MD  Dx:   Encounter Diagnosis     ICD-10-CM    1. History of total knee replacement, right  Z96.651       2. Primary osteoarthritis of right knee  M17.11                          Subjective: Pt noted that he feels that he feels that he is lacking flexibility at his right knee. I also can't sleep I can't lay on my right side or my back\"       Objective: See treatment diary below      Assessment: Modified session to address R LE deficits. With fair tolerance. Progress as able.       Plan: Continue per plan of care.        Precautions: Right TKA on 10/29/24.    Patient's PMHx is remarkable for sleep apnea, Asthma, RA, IBS, hypothyroidism, and insomnia.    Access Code: [email or web address]  URL: [email or web address]  Date: 2024  Prepared by: Wilson Rios    Exercises  - Left Standing Lateral Shift Correction at Wall - Hold  - 4-5 x daily - 7 x weekly - 2-3 sets - 10 reps  - Prone Press Up  - 4-5 x daily - 7 x weekly - 2 sets - 10 reps    Manuals  1/3  12/5 12/10 12/17 12/19 12/23 12/26   Kinesio taping to right superior foot and posterior gastrocnemius region in a basket weave NT   NT NT NT 10 min along with right ITB with an \"I\" strip and an additional \"I\" strip on the lateral aspect of right popliteal region and moving downward and inward with all \"I\" strips at 50-75% tension and basket weave at paper tape tension.    S/L on left to st R LE for quad/hip flexor st man  20sec 5x           Right knee prom NT 10 min total 5 min NT NT NT     Prone lumbar spine extension mobilization, inferior sacral glide and sacral rocking NT     10 min                           Neuro Re-Ed                                 Ther Ex                      Nu step 12 min    Level 4  12 min 12 min 12 min  12 min 12 min 10 min recumbent bike seat 22 10 min recumbent bike seat 17 @ 5 min and upright bike " "seat 14 at 5 min                                      Seated LAQ:R:  NT NT  NT 7.5# x 50 NT 15# x 50    Seated hip flexion:R NT NT  NT 7.5# x 50 NT NT                           Prone hip extension:R NT NT  NT NT NT NT NT   Prone knee flexion:R NT 2 x 10  NT Stretch 20 sec x 5 3 x 10 arom Prone knee flexion self stretch:R: 20 sec x 5    Prone tke:R   NT NT  NT NT NT NT    Prone lying NT    2 min 2 min NT    Prone press ups NT    2 x 10 hep 2 x 10 NT    Prone press ups with lock and sag NT    10 x hep 10 x  NT    Prone extended with elevation  5 min          Knee flexion stretch on 2 nd step:B:  20 sec x 5 20\"x5 20sec 5x  NT 20 sec x 5 20 sec x 5 20 sec x 5   Knee extension stretch off 1 st stretch:B: 20SEC 5X   20 sec x 5 20\"x5 20sec 5x  NT 20 sec x 5 20 sec x 5 20 sec x 5   Standing  hamstring stretch:R NT 20 sec x 5 20\"x5 20sec 5x NT NT NT NT              Standing self strap over pressure into extension at lower lumbar spine  NT     3 x 10 3 x 10 10 x no effect   Standing lumbar spine extension 20X     2 x 10 peripheral symptoms into right ankle region Held today reassess in the future once right distal posterior lower extremity symptoms are centralized NT 2 x 10   Right side glides against wall NT    3 x 10 hep 3 x 10 3 x 10 2 x 10                          Standing slr x 3:B: 2.5# x 20 NT  20X  2.5#  NT NT NT NT   Standing hamstring curls:R 2.5# x 20 NT  20X 2.5#  NT NT NT NT   Mini squats to the chair 2 x 10 2 x 10  20X 3 x 10 3 x 10 3 x 10    Lunges on  NT 2 x 10  20X 3 x 10 3 x 10 3 x 10    SLS and tandem stance:B: NT NT  NT NT NT NT    Side stepping and tandem ambulation NT NT  NT NT NT NT    Forward step ups:R 8\" 6\" x 30 2.5#  8\" x 20 8\" x20 20X 2.5# 2 x 10 2 x 10 on blue foam 8\" x 30 with oval blue  20x 2.5#    Lateral step ups:R 8\" 6\" x 30 2.5#  8\" x 20 8\" x20 20X 2.5#  2 x 10 2 x 10 8\" x 30 with oval blue  20x 2.5#    Step downs:R 8\" 6\" x 20 2.5#  6\" x 20 6\"x20 20X 2.5# NT NT 8\" x 30 with oval blue  20x " 2.5#               Cybex leg press   305 # 20x B      305# 10x L    245# R 10x  Leg press and resistive  # x 30 of each Leg press and resistive # x 30 of each    # x20 290# 30X    SL     170#   NT L x 10 290#, R 170 x 10 and 190 x 10 330# x 40 Bilateral and PF and unilateral leg press 330# L 330# x 20 and R 240# x 20 3    Cybex Claude Extension 12# 20X   48# x10  36# x10 36# 20X NT NT NT     Cybex Leg Curll 70# 20X    50#    x20 35# 20X NT NT NT    Ther Activity                      Seated florwd flex with ball  NT   20x  NT NT NT NT   Gait Training                                 Modalities           CP to right knee  10 min 10 min 10 min 10 MIN  10 min 10 min 10 min seated with CP to posterior thigh and knee    Prone elevated for LB extension with LB MHP     And CP at post R knee  10 min

## 2025-01-10 ENCOUNTER — APPOINTMENT (OUTPATIENT)
Dept: PHYSICAL THERAPY | Age: 60
End: 2025-01-10
Payer: COMMERCIAL

## 2025-01-17 ENCOUNTER — APPOINTMENT (OUTPATIENT)
Dept: PHYSICAL THERAPY | Age: 60
End: 2025-01-17
Payer: COMMERCIAL

## 2025-01-24 ENCOUNTER — APPOINTMENT (OUTPATIENT)
Dept: PHYSICAL THERAPY | Age: 60
End: 2025-01-24
Payer: COMMERCIAL

## 2025-01-31 ENCOUNTER — APPOINTMENT (OUTPATIENT)
Dept: PHYSICAL THERAPY | Age: 60
End: 2025-01-31
Payer: COMMERCIAL

## 2025-02-11 ENCOUNTER — NURSE TRIAGE (OUTPATIENT)
Age: 60
End: 2025-02-11

## 2025-02-11 NOTE — TELEPHONE ENCOUNTER
Last OV 7/23/24 Change in bowel habits and GERD   Colonoscopy 8/17/24 Repeat colonoscopy in 5 years; Benefiber supplement daily       Wife Mari (on Communication Consent form) transferred in. Pt reports rectal pain and bleeding hemorrhoid with BM only for past week. Requesting appt with Dr. Anaya only.    Typically has 1-2 BMs daily. BRB however unsure if mixed in with stool; not significant amount per wife. Unsure of stool consistency. Using suppositories and creams.    Advised fiber, increase hydration, stools softeners and Miralax if having hard stools. Pt to try Anusol. Appt scheduled 6/2025. Will monitor and call for any worsening.      Reason for Disposition   Affirmative: The patient has BRBPR (bright red blood per rectum) with wiping, not mixed with stool, AND feels a hemorrhoid    Answer Assessment - Initial Assessment Questions  1. When did your symptoms start?   1 wk ago  2. Is there bright red blood when wiping or streaks in the stool? If yes, how many occurrences have you had in the last 24 hours?  BRB with BM only 1-2 BM daily  3. Do you feel this is a mild, moderate, or severe amount of blood?   Mild  4. Have your symptoms improved or weakened?   Same   5. Are you experiencing more diarrhea or constipation?   Unsure   6. Do you have anything prescribed or over the counter for this? If so, did they offer any relief?   suppositories and creams  7. Are you experiencing any additional symptoms? If yes, please describe what your symptoms are.   Denies    Protocols used: GI-Hemorrhoids-ADULT-[place]

## 2025-07-01 ENCOUNTER — TELEPHONE (OUTPATIENT)
Dept: GASTROENTEROLOGY | Facility: CLINIC | Age: 60
End: 2025-07-01

## 2025-07-01 DIAGNOSIS — R13.19 ESOPHAGEAL DYSPHAGIA: ICD-10-CM

## 2025-07-01 RX ORDER — PANTOPRAZOLE SODIUM 40 MG/1
40 TABLET, DELAYED RELEASE ORAL DAILY
Qty: 90 TABLET | Refills: 0 | Status: SHIPPED | OUTPATIENT
Start: 2025-07-01

## 2025-07-01 NOTE — TELEPHONE ENCOUNTER
Pt's wife requests refill for pantoprazole 40 mg daily to be sent to [facility] on file. Pt has 2 days worth of medication remaining.

## 2025-07-17 ENCOUNTER — TELEPHONE (OUTPATIENT)
Dept: GASTROENTEROLOGY | Facility: CLINIC | Age: 60
End: 2025-07-17

## 2025-07-17 NOTE — TELEPHONE ENCOUNTER
[facility] informing patient that the appt 10/13 with Dr. Anaya has been canceled.  Provider not in the office.  Patient will need to reschedule